# Patient Record
Sex: MALE | Race: WHITE | NOT HISPANIC OR LATINO
[De-identification: names, ages, dates, MRNs, and addresses within clinical notes are randomized per-mention and may not be internally consistent; named-entity substitution may affect disease eponyms.]

---

## 2017-08-18 ENCOUNTER — APPOINTMENT (OUTPATIENT)
Dept: NEUROLOGY | Facility: CLINIC | Age: 47
End: 2017-08-18

## 2019-10-23 ENCOUNTER — APPOINTMENT (OUTPATIENT)
Dept: NEUROLOGY | Facility: CLINIC | Age: 49
End: 2019-10-23

## 2020-12-01 ENCOUNTER — INPATIENT (INPATIENT)
Facility: HOSPITAL | Age: 50
LOS: 3 days | Discharge: HOME | DRG: 871 | End: 2020-12-05
Attending: INTERNAL MEDICINE | Admitting: INTERNAL MEDICINE
Payer: MEDICAID

## 2020-12-01 ENCOUNTER — OUTPATIENT (OUTPATIENT)
Dept: OUTPATIENT SERVICES | Facility: HOSPITAL | Age: 50
LOS: 1 days | End: 2020-12-01
Payer: MEDICAID

## 2020-12-01 VITALS
RESPIRATION RATE: 18 BRPM | WEIGHT: 199.96 LBS | SYSTOLIC BLOOD PRESSURE: 94 MMHG | HEART RATE: 102 BPM | DIASTOLIC BLOOD PRESSURE: 67 MMHG | HEIGHT: 66 IN | TEMPERATURE: 100 F | OXYGEN SATURATION: 97 %

## 2020-12-01 DIAGNOSIS — I21.4 NON-ST ELEVATION (NSTEMI) MYOCARDIAL INFARCTION: ICD-10-CM

## 2020-12-01 DIAGNOSIS — U07.1 COVID-19: ICD-10-CM

## 2020-12-01 DIAGNOSIS — N17.9 ACUTE KIDNEY FAILURE, UNSPECIFIED: ICD-10-CM

## 2020-12-01 DIAGNOSIS — R19.7 DIARRHEA, UNSPECIFIED: ICD-10-CM

## 2020-12-01 DIAGNOSIS — Z29.9 ENCOUNTER FOR PROPHYLACTIC MEASURES, UNSPECIFIED: ICD-10-CM

## 2020-12-01 DIAGNOSIS — R55 SYNCOPE AND COLLAPSE: ICD-10-CM

## 2020-12-01 LAB
24R-OH-CALCIDIOL SERPL-MCNC: 43.6 NG/ML — SIGNIFICANT CHANGE UP (ref 30–80)
A1C WITH ESTIMATED AVERAGE GLUCOSE RESULT: 7 % — HIGH (ref 4–5.6)
ALBUMIN SERPL ELPH-MCNC: 3.4 G/DL — LOW (ref 3.5–5)
ALP SERPL-CCNC: 66 U/L — SIGNIFICANT CHANGE UP (ref 40–120)
ALT FLD-CCNC: 45 U/L DA — SIGNIFICANT CHANGE UP (ref 10–60)
ANION GAP SERPL CALC-SCNC: 10 MMOL/L — SIGNIFICANT CHANGE UP (ref 5–17)
ANION GAP SERPL CALC-SCNC: 12 MMOL/L — SIGNIFICANT CHANGE UP (ref 5–17)
ANION GAP SERPL CALC-SCNC: 9 MMOL/L — SIGNIFICANT CHANGE UP (ref 5–17)
APPEARANCE UR: CLEAR — SIGNIFICANT CHANGE UP
APTT BLD: 32.3 SEC — SIGNIFICANT CHANGE UP (ref 27.5–35.5)
AST SERPL-CCNC: 52 U/L — HIGH (ref 10–40)
BACTERIA # UR AUTO: NEGATIVE /HPF — SIGNIFICANT CHANGE UP
BASOPHILS # BLD AUTO: 0.01 K/UL — SIGNIFICANT CHANGE UP (ref 0–0.2)
BASOPHILS NFR BLD AUTO: 0.2 % — SIGNIFICANT CHANGE UP (ref 0–2)
BILIRUB SERPL-MCNC: 0.9 MG/DL — SIGNIFICANT CHANGE UP (ref 0.2–1.2)
BILIRUB UR-MCNC: NEGATIVE — SIGNIFICANT CHANGE UP
BUN SERPL-MCNC: 31 MG/DL — HIGH (ref 7–18)
BUN SERPL-MCNC: 35 MG/DL — HIGH (ref 7–18)
BUN SERPL-MCNC: 38 MG/DL — HIGH (ref 7–18)
CALCIUM SERPL-MCNC: 7.9 MG/DL — LOW (ref 8.4–10.5)
CALCIUM SERPL-MCNC: 8.2 MG/DL — LOW (ref 8.4–10.5)
CALCIUM SERPL-MCNC: 8.6 MG/DL — SIGNIFICANT CHANGE UP (ref 8.4–10.5)
CHLORIDE SERPL-SCNC: 100 MMOL/L — SIGNIFICANT CHANGE UP (ref 96–108)
CHLORIDE SERPL-SCNC: 95 MMOL/L — LOW (ref 96–108)
CHLORIDE SERPL-SCNC: 98 MMOL/L — SIGNIFICANT CHANGE UP (ref 96–108)
CHOLEST SERPL-MCNC: 93 MG/DL — SIGNIFICANT CHANGE UP
CK MB BLD-MCNC: 0.4 % — SIGNIFICANT CHANGE UP (ref 0–3.5)
CK MB CFR SERPL CALC: 1.8 NG/ML — SIGNIFICANT CHANGE UP (ref 0–3.6)
CK SERPL-CCNC: 503 U/L — HIGH (ref 35–232)
CO2 SERPL-SCNC: 25 MMOL/L — SIGNIFICANT CHANGE UP (ref 22–31)
CO2 SERPL-SCNC: 26 MMOL/L — SIGNIFICANT CHANGE UP (ref 22–31)
CO2 SERPL-SCNC: 27 MMOL/L — SIGNIFICANT CHANGE UP (ref 22–31)
COLOR SPEC: YELLOW — SIGNIFICANT CHANGE UP
CREAT ?TM UR-MCNC: 69 MG/DL — SIGNIFICANT CHANGE UP
CREAT SERPL-MCNC: 2.1 MG/DL — HIGH (ref 0.5–1.3)
CREAT SERPL-MCNC: 2.2 MG/DL — HIGH (ref 0.5–1.3)
CREAT SERPL-MCNC: 2.42 MG/DL — HIGH (ref 0.5–1.3)
D DIMER BLD IA.RAPID-MCNC: 388 NG/ML DDU — HIGH
DIFF PNL FLD: ABNORMAL
EOSINOPHIL # BLD AUTO: 0 K/UL — SIGNIFICANT CHANGE UP (ref 0–0.5)
EOSINOPHIL NFR BLD AUTO: 0 % — SIGNIFICANT CHANGE UP (ref 0–6)
EPI CELLS # UR: ABNORMAL /HPF
ESTIMATED AVERAGE GLUCOSE: 154 MG/DL — HIGH (ref 68–114)
FERRITIN SERPL-MCNC: 761 NG/ML — HIGH (ref 30–400)
FOLATE SERPL-MCNC: >20 NG/ML — SIGNIFICANT CHANGE UP
GLUCOSE SERPL-MCNC: 112 MG/DL — HIGH (ref 70–99)
GLUCOSE SERPL-MCNC: 126 MG/DL — HIGH (ref 70–99)
GLUCOSE SERPL-MCNC: 145 MG/DL — HIGH (ref 70–99)
GLUCOSE UR QL: NEGATIVE — SIGNIFICANT CHANGE UP
HCT VFR BLD CALC: 34.3 % — LOW (ref 39–50)
HCT VFR BLD CALC: 36.9 % — LOW (ref 39–50)
HDLC SERPL-MCNC: 32 MG/DL — LOW
HGB BLD-MCNC: 11.2 G/DL — LOW (ref 13–17)
HGB BLD-MCNC: 12.5 G/DL — LOW (ref 13–17)
IMM GRANULOCYTES NFR BLD AUTO: 0.2 % — SIGNIFICANT CHANGE UP (ref 0–1.5)
INR BLD: 1.23 RATIO — HIGH (ref 0.88–1.16)
IRON SATN MFR SERPL: 10 % — LOW (ref 20–55)
IRON SATN MFR SERPL: 20 UG/DL — LOW (ref 65–170)
KETONES UR-MCNC: NEGATIVE — SIGNIFICANT CHANGE UP
LDH SERPL L TO P-CCNC: 371 U/L — HIGH (ref 120–225)
LEUKOCYTE ESTERASE UR-ACNC: NEGATIVE — SIGNIFICANT CHANGE UP
LIPID PNL WITH DIRECT LDL SERPL: 26 MG/DL — SIGNIFICANT CHANGE UP
LYMPHOCYTES # BLD AUTO: 0.95 K/UL — LOW (ref 1–3.3)
LYMPHOCYTES # BLD AUTO: 20.6 % — SIGNIFICANT CHANGE UP (ref 13–44)
MAGNESIUM SERPL-MCNC: 1.8 MG/DL — SIGNIFICANT CHANGE UP (ref 1.6–2.6)
MAGNESIUM SERPL-MCNC: 1.9 MG/DL — SIGNIFICANT CHANGE UP (ref 1.6–2.6)
MCHC RBC-ENTMCNC: 29.2 PG — SIGNIFICANT CHANGE UP (ref 27–34)
MCHC RBC-ENTMCNC: 29.8 PG — SIGNIFICANT CHANGE UP (ref 27–34)
MCHC RBC-ENTMCNC: 32.7 GM/DL — SIGNIFICANT CHANGE UP (ref 32–36)
MCHC RBC-ENTMCNC: 33.9 GM/DL — SIGNIFICANT CHANGE UP (ref 32–36)
MCV RBC AUTO: 87.9 FL — SIGNIFICANT CHANGE UP (ref 80–100)
MCV RBC AUTO: 89.3 FL — SIGNIFICANT CHANGE UP (ref 80–100)
MONOCYTES # BLD AUTO: 0.32 K/UL — SIGNIFICANT CHANGE UP (ref 0–0.9)
MONOCYTES NFR BLD AUTO: 6.9 % — SIGNIFICANT CHANGE UP (ref 2–14)
NEUTROPHILS # BLD AUTO: 3.32 K/UL — SIGNIFICANT CHANGE UP (ref 1.8–7.4)
NEUTROPHILS NFR BLD AUTO: 72.1 % — SIGNIFICANT CHANGE UP (ref 43–77)
NITRITE UR-MCNC: NEGATIVE — SIGNIFICANT CHANGE UP
NON HDL CHOLESTEROL: 61 MG/DL — SIGNIFICANT CHANGE UP
NRBC # BLD: 0 /100 WBCS — SIGNIFICANT CHANGE UP (ref 0–0)
NRBC # BLD: 0 /100 WBCS — SIGNIFICANT CHANGE UP (ref 0–0)
PH UR: 6 — SIGNIFICANT CHANGE UP (ref 5–8)
PHOSPHATE SERPL-MCNC: 2.2 MG/DL — LOW (ref 2.5–4.5)
PHOSPHATE SERPL-MCNC: 2.9 MG/DL — SIGNIFICANT CHANGE UP (ref 2.5–4.5)
PLATELET # BLD AUTO: 149 K/UL — LOW (ref 150–400)
PLATELET # BLD AUTO: 179 K/UL — SIGNIFICANT CHANGE UP (ref 150–400)
POTASSIUM SERPL-MCNC: 3.3 MMOL/L — LOW (ref 3.5–5.3)
POTASSIUM SERPL-MCNC: 3.4 MMOL/L — LOW (ref 3.5–5.3)
POTASSIUM SERPL-MCNC: 4 MMOL/L — SIGNIFICANT CHANGE UP (ref 3.5–5.3)
POTASSIUM SERPL-SCNC: 3.3 MMOL/L — LOW (ref 3.5–5.3)
POTASSIUM SERPL-SCNC: 3.4 MMOL/L — LOW (ref 3.5–5.3)
POTASSIUM SERPL-SCNC: 4 MMOL/L — SIGNIFICANT CHANGE UP (ref 3.5–5.3)
POTASSIUM UR-SCNC: 21 MMOL/L — SIGNIFICANT CHANGE UP
PROT SERPL-MCNC: 8.2 G/DL — SIGNIFICANT CHANGE UP (ref 6–8.3)
PROT UR-MCNC: NEGATIVE — SIGNIFICANT CHANGE UP
PROTHROM AB SERPL-ACNC: 14.5 SEC — HIGH (ref 10.6–13.6)
RAPID RVP RESULT: DETECTED
RBC # BLD: 3.84 M/UL — LOW (ref 4.2–5.8)
RBC # BLD: 4.2 M/UL — SIGNIFICANT CHANGE UP (ref 4.2–5.8)
RBC # FLD: 13.2 % — SIGNIFICANT CHANGE UP (ref 10.3–14.5)
RBC # FLD: 13.3 % — SIGNIFICANT CHANGE UP (ref 10.3–14.5)
RBC CASTS # UR COMP ASSIST: SIGNIFICANT CHANGE UP /HPF (ref 0–2)
SARS-COV-2 IGG SERPL QL IA: NEGATIVE — SIGNIFICANT CHANGE UP
SARS-COV-2 IGM SERPL IA-ACNC: 0.07 INDEX — SIGNIFICANT CHANGE UP
SARS-COV-2 RNA SPEC QL NAA+PROBE: DETECTED
SARS-COV-2 RNA SPEC QL NAA+PROBE: DETECTED
SODIUM SERPL-SCNC: 132 MMOL/L — LOW (ref 135–145)
SODIUM SERPL-SCNC: 135 MMOL/L — SIGNIFICANT CHANGE UP (ref 135–145)
SODIUM SERPL-SCNC: 135 MMOL/L — SIGNIFICANT CHANGE UP (ref 135–145)
SODIUM UR-SCNC: 94 MMOL/L — SIGNIFICANT CHANGE UP
SP GR SPEC: 1.01 — SIGNIFICANT CHANGE UP (ref 1.01–1.02)
TIBC SERPL-MCNC: 194 UG/DL — LOW (ref 250–450)
TRIGL SERPL-MCNC: 175 MG/DL — HIGH
TROPONIN I SERPL-MCNC: 0.27 NG/ML — HIGH (ref 0–0.04)
TROPONIN I SERPL-MCNC: 0.3 NG/ML — HIGH (ref 0–0.04)
TROPONIN I SERPL-MCNC: 0.9 NG/ML — HIGH (ref 0–0.04)
TSH SERPL-MCNC: 1.02 UU/ML — SIGNIFICANT CHANGE UP (ref 0.34–4.82)
UIBC SERPL-MCNC: 174 UG/DL — SIGNIFICANT CHANGE UP (ref 110–370)
UROBILINOGEN FLD QL: NEGATIVE — SIGNIFICANT CHANGE UP
VIT B12 SERPL-MCNC: 917 PG/ML — SIGNIFICANT CHANGE UP (ref 232–1245)
WBC # BLD: 3.39 K/UL — LOW (ref 3.8–10.5)
WBC # BLD: 4.61 K/UL — SIGNIFICANT CHANGE UP (ref 3.8–10.5)
WBC # FLD AUTO: 3.39 K/UL — LOW (ref 3.8–10.5)
WBC # FLD AUTO: 4.61 K/UL — SIGNIFICANT CHANGE UP (ref 3.8–10.5)
WBC UR QL: SIGNIFICANT CHANGE UP /HPF (ref 0–5)

## 2020-12-01 PROCEDURE — 71045 X-RAY EXAM CHEST 1 VIEW: CPT | Mod: 26

## 2020-12-01 PROCEDURE — 99285 EMERGENCY DEPT VISIT HI MDM: CPT

## 2020-12-01 RX ORDER — DEXTROSE 50 % IN WATER 50 %
15 SYRINGE (ML) INTRAVENOUS ONCE
Refills: 0 | Status: DISCONTINUED | OUTPATIENT
Start: 2020-12-01 | End: 2020-12-02

## 2020-12-01 RX ORDER — INSULIN LISPRO 100/ML
VIAL (ML) SUBCUTANEOUS
Refills: 0 | Status: DISCONTINUED | OUTPATIENT
Start: 2020-12-01 | End: 2020-12-05

## 2020-12-01 RX ORDER — POTASSIUM PHOSPHATE, MONOBASIC POTASSIUM PHOSPHATE, DIBASIC 236; 224 MG/ML; MG/ML
15 INJECTION, SOLUTION INTRAVENOUS ONCE
Refills: 0 | Status: COMPLETED | OUTPATIENT
Start: 2020-12-01 | End: 2020-12-01

## 2020-12-01 RX ORDER — POTASSIUM CHLORIDE 20 MEQ
40 PACKET (EA) ORAL ONCE
Refills: 0 | Status: COMPLETED | OUTPATIENT
Start: 2020-12-01 | End: 2020-12-01

## 2020-12-01 RX ORDER — ASPIRIN/CALCIUM CARB/MAGNESIUM 324 MG
81 TABLET ORAL ONCE
Refills: 0 | Status: COMPLETED | OUTPATIENT
Start: 2020-12-01 | End: 2020-12-01

## 2020-12-01 RX ORDER — ACETAMINOPHEN 500 MG
650 TABLET ORAL EVERY 6 HOURS
Refills: 0 | Status: DISCONTINUED | OUTPATIENT
Start: 2020-12-01 | End: 2020-12-05

## 2020-12-01 RX ORDER — SODIUM CHLORIDE 9 MG/ML
1000 INJECTION, SOLUTION INTRAVENOUS
Refills: 0 | Status: DISCONTINUED | OUTPATIENT
Start: 2020-12-01 | End: 2020-12-02

## 2020-12-01 RX ORDER — DEXTROSE 50 % IN WATER 50 %
25 SYRINGE (ML) INTRAVENOUS ONCE
Refills: 0 | Status: DISCONTINUED | OUTPATIENT
Start: 2020-12-01 | End: 2020-12-02

## 2020-12-01 RX ORDER — ACETAMINOPHEN 500 MG
650 TABLET ORAL ONCE
Refills: 0 | Status: COMPLETED | OUTPATIENT
Start: 2020-12-01 | End: 2020-12-01

## 2020-12-01 RX ORDER — HEPARIN SODIUM 5000 [USP'U]/ML
5000 INJECTION INTRAVENOUS; SUBCUTANEOUS EVERY 8 HOURS
Refills: 0 | Status: DISCONTINUED | OUTPATIENT
Start: 2020-12-01 | End: 2020-12-05

## 2020-12-01 RX ORDER — PANTOPRAZOLE SODIUM 20 MG/1
40 TABLET, DELAYED RELEASE ORAL
Refills: 0 | Status: DISCONTINUED | OUTPATIENT
Start: 2020-12-01 | End: 2020-12-02

## 2020-12-01 RX ORDER — ATORVASTATIN CALCIUM 80 MG/1
80 TABLET, FILM COATED ORAL AT BEDTIME
Refills: 0 | Status: DISCONTINUED | OUTPATIENT
Start: 2020-12-01 | End: 2020-12-05

## 2020-12-01 RX ORDER — HEPARIN SODIUM 5000 [USP'U]/ML
5000 INJECTION INTRAVENOUS; SUBCUTANEOUS EVERY 8 HOURS
Refills: 0 | Status: DISCONTINUED | OUTPATIENT
Start: 2020-12-01 | End: 2020-12-01

## 2020-12-01 RX ORDER — SODIUM CHLORIDE 9 MG/ML
2000 INJECTION INTRAMUSCULAR; INTRAVENOUS; SUBCUTANEOUS ONCE
Refills: 0 | Status: COMPLETED | OUTPATIENT
Start: 2020-12-01 | End: 2020-12-01

## 2020-12-01 RX ORDER — GLUCAGON INJECTION, SOLUTION 0.5 MG/.1ML
1 INJECTION, SOLUTION SUBCUTANEOUS ONCE
Refills: 0 | Status: DISCONTINUED | OUTPATIENT
Start: 2020-12-01 | End: 2020-12-05

## 2020-12-01 RX ORDER — LOPERAMIDE HCL 2 MG
4 TABLET ORAL ONCE
Refills: 0 | Status: COMPLETED | OUTPATIENT
Start: 2020-12-01 | End: 2020-12-01

## 2020-12-01 RX ORDER — DEXTROSE 50 % IN WATER 50 %
12.5 SYRINGE (ML) INTRAVENOUS ONCE
Refills: 0 | Status: DISCONTINUED | OUTPATIENT
Start: 2020-12-01 | End: 2020-12-02

## 2020-12-01 RX ORDER — ASPIRIN/CALCIUM CARB/MAGNESIUM 324 MG
81 TABLET ORAL DAILY
Refills: 0 | Status: DISCONTINUED | OUTPATIENT
Start: 2020-12-01 | End: 2020-12-05

## 2020-12-01 RX ADMIN — SODIUM CHLORIDE 4000 MILLILITER(S): 9 INJECTION INTRAMUSCULAR; INTRAVENOUS; SUBCUTANEOUS at 09:26

## 2020-12-01 RX ADMIN — Medication 40 MILLIEQUIVALENT(S): at 14:05

## 2020-12-01 RX ADMIN — Medication 4 MILLIGRAM(S): at 09:26

## 2020-12-01 RX ADMIN — Medication 40 MILLIEQUIVALENT(S): at 11:09

## 2020-12-01 RX ADMIN — ATORVASTATIN CALCIUM 80 MILLIGRAM(S): 80 TABLET, FILM COATED ORAL at 21:38

## 2020-12-01 RX ADMIN — Medication 650 MILLIGRAM(S): at 22:07

## 2020-12-01 RX ADMIN — Medication 81 MILLIGRAM(S): at 11:08

## 2020-12-01 RX ADMIN — POTASSIUM PHOSPHATE, MONOBASIC POTASSIUM PHOSPHATE, DIBASIC 62.5 MILLIMOLE(S): 236; 224 INJECTION, SOLUTION INTRAVENOUS at 21:39

## 2020-12-01 RX ADMIN — HEPARIN SODIUM 5000 UNIT(S): 5000 INJECTION INTRAVENOUS; SUBCUTANEOUS at 21:37

## 2020-12-01 RX ADMIN — HEPARIN SODIUM 5000 UNIT(S): 5000 INJECTION INTRAVENOUS; SUBCUTANEOUS at 14:04

## 2020-12-01 RX ADMIN — Medication 81 MILLIGRAM(S): at 21:37

## 2020-12-01 RX ADMIN — Medication 650 MILLIGRAM(S): at 20:02

## 2020-12-01 NOTE — CONSULT NOTE ADULT - NEGATIVE ENMT SYMPTOMS
no tinnitus/no hearing difficulty/no vertigo/no nasal congestion/no nasal discharge/no nasal obstruction/no post-nasal discharge/no throat pain/no dysphagia/no nose bleeds/no ear pain/no sinus symptoms/no gum bleeding/no dry mouth

## 2020-12-01 NOTE — CONSULT NOTE ADULT - RS GEN PE MLT RESP DETAILS PC
no wheezes/no rhonchi/no rales/respirations non-labored/breath sounds equal/good air movement/airway patent

## 2020-12-01 NOTE — ED ADULT NURSE NOTE - CHIEF COMPLAINT QUOTE
biba s/p syncopal episode at home c/o feeling weak , diarrhea x 3 days after eating at [employer] . no abd pain no n/v

## 2020-12-01 NOTE — CONSULT NOTE ADULT - NEGATIVE GASTROINTESTINAL SYMPTOMS
no melena/no constipation/no steatorrhea/no nausea/no jaundice/no vomiting/no change in bowel habits/no flatulence/no abdominal pain/no hematochezia/no hiccoughs

## 2020-12-01 NOTE — H&P ADULT - ASSESSMENT
49yo M from home lives with family walks independently with PMH pre-DM , HLD , HTN, cardiac attack and stroke (residual left hemianopsia), OA of b/l Knees presented with and episode of witnessed syncope in the morning. admitted for syncope workup likely in setting of Covid infection.

## 2020-12-01 NOTE — H&P ADULT - PROBLEM SELECTOR PLAN 3
history of heart attach and stroke x4 years ago with residual left parietal hemianopsia   elevated troponin, no ECG changes, likely demand ischemia in setting of COVID  Troponin : 0.2>0.3   follow up troponin   Serial ECG   Echo  Cardiology Dr Miguel

## 2020-12-01 NOTE — H&P ADULT - PROBLEM SELECTOR PLAN 1
s/p few days of FTT and diarrhea , likely vasovagal due to dehydration  orthostatic sp s/p few days of FTT and diarrhea , likely vasovagal due to dehydration  orthostatic sp IVF in ED negative   Tele    echo   Troponin : 0.2>0.3   follow up troponin   carotid doppler   Cardio Dr Miguel consulted

## 2020-12-01 NOTE — H&P ADULT - PROBLEM SELECTOR PLAN 5
LUCI  gina in setting of COVID versus pre renal azotemia   - Check urinalysis, urine lytes (uosm, urine sodium, urine creatinine)   - Nephro Dr Rubio consulted

## 2020-12-01 NOTE — ED ADULT NURSE NOTE - NSIMPLEMENTINTERV_GEN_ALL_ED
Implemented All Fall with Harm Risk Interventions:  [place] to call system. Call bell, personal items and telephone within reach. Instruct patient to call for assistance. Room bathroom lighting operational. Non-slip footwear when patient is off stretcher. Physically safe environment: no spills, clutter or unnecessary equipment. Stretcher in lowest position, wheels locked, appropriate side rails in place. Provide visual cue, wrist band, yellow gown, etc. Monitor gait and stability. Monitor for mental status changes and reorient to person, place, and time. Review medications for side effects contributing to fall risk. Reinforce activity limits and safety measures with patient and family. Provide visual clues: red socks.

## 2020-12-01 NOTE — H&P ADULT - HISTORY OF PRESENT ILLNESS
51yo M from home lives with family walks independently with PMH pre-DM , HLD , HTN, cardiac attack and stroke (residual left hemianopsia), OA of b/l Knees presented with and episode of witnessed syncope in the morning . pt reports food poisoning associated with multiple watery non bloody diarrhea , poor oral intake for 3 days. pt had his Bp medication with small amount of water during past 3 days. pt  went to the bathroom this AM and had diarrhea, when he stood up he felt dizzy and passed out. LOC for seconds , denies chest pain , palpitation , sob , cold sweats , chills, Nausea , vomiting any focal weakness or numbness. Pt had a history of cardiac attack and stroke ( with residual left peripheral vision loss)  4 years ago, had been on blood thinners for 3 months after cardiac attack ,  follows up with cardiologist regularly , last echo in June 2020 showed cardiac function within normal limits. patient non smoker, denies pedal edema , ARAGON , orthopnea , PND, can walk >1-0 blocks and goes more than 4 flights of stairs. never had stress test in the past       Ed course : T1 mildly elevated : 0.274, ECG :NSR , SCr : 2.42 , received 2000cc NS   GOC: FULL CODE 51yo M from home lives with family walks independently with PMH pre-DM , HLD , HTN, cardiac attack and stroke (residual left hemianopsia), OA of b/l Knees presented with and episode of witnessed syncope in the morning . pt reports food poisoning associated with multiple watery non bloody diarrhea , poor oral intake for 3 days. pt had his Bp medication with small amount of water during past 3 days. pt  went to the bathroom this AM and had diarrhea, when he stood up he felt dizzy and passed out. LOC for seconds , denies chest pain , palpitation , sob , cold sweats , chills, Nausea , vomiting any focal weakness or numbness. Pt had a history of cardiac attack and stroke ( with residual left peripheral vision loss)  4 years ago, had been on blood thinners for 3 months after cardiac attack ,  follows up with cardiologist regularly , last echo in June 2020 showed cardiac function within normal limits. patient non smoker, denies pedal edema , ARAGON , orthopnea , PND, can walk >1-0 blocks and goes more than 4 flights of stairs. never had stress test in the past       Ed course : T1 mildly elevated : 0.274, ECG :NSR , SCr : 2.42 , received 2000cc NS    ECG : NSR   GOC: FULL CODE 49yo M from home lives with family walks independently with PMH pre-DM , HLD , HTN, cardiac attack and stroke (residual left hemianopsia), OA of b/l Knees presented with and episode of witnessed syncope in the morning . pt reports food poisoning associated with multiple watery non bloody diarrhea , poor oral intake for 3 days. pt had his Bp medication with small amount of water during past 3 days. pt  went to the bathroom this AM and had diarrhea, when he stood up he felt dizzy and passed out. LOC for seconds , denies chest pain , palpitation , sob , cold sweats , chills, Nausea , vomiting any focal weakness or numbness. Pt had a history of cardiac attack and stroke ( with residual left peripheral vision loss)  4 years ago, had been on blood thinners for 3 months after cardiac attack ,  follows up with cardiologist regularly , last echo in June 2020 showed cardiac function within normal limits. patient non smoker, denies pedal edema , ARAGON , orthopnea , PND, can walk >10 blocks and goes more than 4 flights of stairs. never had stress test in the past       Ed course : T1 mildly elevated : 0.274, ECG :NSR , SCr : 2.42 , received 2000cc NS    ECG : NSR   GOC: FULL CODE

## 2020-12-01 NOTE — ED PROVIDER NOTE - CLINICAL SUMMARY MEDICAL DECISION MAKING FREE TEXT BOX
50 year old male with vasovagal syncope after diarrhea for days, getting off toilet today when he passed out. No complaints now, well appearing, but BP is low, will hydrate, check labs and reassess.

## 2020-12-01 NOTE — H&P ADULT - PROBLEM SELECTOR PLAN 6
c/w heparin 5000 TID for now , change to Lovenox onc kidney functio improved since Lovenox is Ac of choice in COVID

## 2020-12-01 NOTE — H&P ADULT - PROBLEM SELECTOR PLAN 4
patient with positive covid, spiked fever during admission   CXR   no acute infiltrates    saturating >96% on RA    s/p 2000cc Ns in ED   -Will send ESR, CRP, Procalcitonin and other COVID markers   -Tylenol PRN for fever  -Will start patient on prophylactic Heparin   -continue to trend D-dimer, CRP, LDH, Troponin, Ferritin, CPK. patient with positive covid, spiked fever during admission   CXR   no acute infiltrates    saturating >96% on RA    s/p 2000cc Ns in ED   -Will send ESR, CRP, Procalcitonin and other COVID markers   -Tylenol PRN for fever  -Will start patient on prophylactic Heparin   -continue to trend D-dimer, CRP, LDH, Troponin, Ferritin, CPK.  - will not consider Decadron or Remdesivir since patient is not requiring oxygen supplementation

## 2020-12-01 NOTE — CONSULT NOTE ADULT - SUBJECTIVE AND OBJECTIVE BOX
[  ] STAT REQUEST              [ X ] ROUTINE REQUEST    Patient is a 50 year old with diarrhea. GI consulted to evaluate.        HPI:  49yo M from home lives with family walks independently with PMH pre-DM , HLD , HTN, cardiac attack and stroke (residual left hemianopsia), OA of b/l Knees presented with and episode of witnessed syncope in the morning . Patient reports 3 days history of watery non bloody diarrhea and poor oral intake. for 3 days. pt had his Bp medication with small amount of water during past 3 days. pt  went to the bathroom this AM and had diarrhea, when he stood up he felt dizzy and passed out. Patient denies hematemesis, hematochezia, melena, fever, chills, chest pain , palpitation , SOB, palpitation, cough, hematuria, dysuria, recent traveling or antibiotic use.       PAIN MANAGEMENT:  Pain Scale:                 0/10  Pain Location:      Prior Colonoscopy:  No prior colonoscopy    PAST MEDICAL HISTORY  HTN  DM  HLD  CAD  OA, MI        PAST SURGICAL HISTORY  No significant surgical history reported      Allergies    No Known Allergies    Intolerances  None       MEDICATIONS  (STANDING):  aspirin enteric coated 81 milliGRAM(s) Oral daily  atorvastatin 80 milliGRAM(s) Oral at bedtime  dextrose 40% Gel 15 Gram(s) Oral once  dextrose 5%. 1000 milliLiter(s) (50 mL/Hr) IV Continuous <Continuous>  dextrose 5%. 1000 milliLiter(s) (100 mL/Hr) IV Continuous <Continuous>  dextrose 50% Injectable 25 Gram(s) IV Push once  dextrose 50% Injectable 12.5 Gram(s) IV Push once  dextrose 50% Injectable 25 Gram(s) IV Push once  glucagon  Injectable 1 milliGRAM(s) IntraMuscular once  heparin   Injectable 5000 Unit(s) SubCutaneous every 8 hours  insulin lispro (ADMELOG) corrective regimen sliding scale   SubCutaneous Before meals and at bedtime  pantoprazole    Tablet 40 milliGRAM(s) Oral before breakfast  potassium phosphate IVPB 15 milliMole(s) IV Intermittent once    MEDICATIONS  (PRN):  acetaminophen   Tablet .. 650 milliGRAM(s) Oral every 6 hours PRN Temp greater or equal to 38C (100.4F), Mild Pain (1 - 3)      SOCIAL HISTORY  Advanced Directives:       [ X ] Full Code       [  ] DNR  Marital Status:         [  ] M      [  X] S      [  ] D       [  ] W  Children:       [ X ] Yes      [  ] No  Occupation:        [  ] Employed       [ X ] Unemployed       [  ] Retired  Diet:       [ X ] Regular       [  ] PEG feeding          [  ] NG tube feeding  Drug Use:           [ X ] Patient denied          [  ] Yes  Alcohol:           [ X ] No             [  ] Yes (socially)         [  ] Yes (chronic)  Tobacco:           [  ] Yes           [ X ] No    FAMILY HISTORY  [ X ] Heart Disease            [ X ] Diabetes             [ X ] HTN             [  ] Colon Cancer             [  ] Stomach Cancer              [  ] Pancreatic Cancer      VITAL SIGNS   Vital Signs Last 24 Hrs  T(C): 38.3 (01 Dec 2020 18:14), Max: 38.3 (01 Dec 2020 18:14)  T(F): 101 (01 Dec 2020 18:14), Max: 101 (01 Dec 2020 18:14)  HR: 95 (01 Dec 2020 18:14) (95 - 102)  BP: 120/83 (01 Dec 2020 18:14) (94/67 - 136/83)   RR: 18 (01 Dec 2020 18:14) (18 - 18)  SpO2: 96% (01 Dec 2020 18:14) (96% - 98%)  Daily Height in cm: 167.64 (01 Dec 2020 07:31)    Daily Weight in k.1 (01 Dec 2020 18:14)         CBC Full  -  ( 01 Dec 2020 12:16 )  WBC Count : 3.39 K/uL  RBC Count : 3.84 M/uL  Hemoglobin : 11.2 g/dL  Hematocrit : 34.3 %  Platelet Count - Automated : 149 K/uL  Mean Cell Volume : 89.3 fl  Mean Cell Hemoglobin : 29.2 pg  Mean Cell Hemoglobin Concentration : 32.7 gm/dL  Auto Neutrophil # : x  Auto Lymphocyte # : x  Auto Monocyte # : x  Auto Eosinophil # : x  Auto Basophil # : x  Auto Neutrophil % : x  Auto Lymphocyte % : x  Auto Monocyte % : x  Auto Eosinophil % : x  Auto Basophil % : x      12    135  |  100  |  31<H>  ----------------------------<  126<H>  4.0   |  26  |  2.10<H>    Ca    8.2<L>      01 Dec 2020 17:27  Phos  2.2     12-  Mg     1.9     12    TPro  8.2  /  Alb  3.4<L>  /  TBili  0.9  /  DBili  x   /  AST  52<H>  /  ALT  45  /  AlkPhos  66  12-     PT/INR - ( 01 Dec 2020 09:44 )   PT: 14.5 sec;   INR: 1.23 ratio       PTT - ( 01 Dec 2020 09:44 )  PTT:32.3 sec      Iron with Total Binding Capacity in AM (12..20 @ 12:16)   Iron - Total Binding Capacity.: 194 ug/dL   % Saturation, Iron: 10 %   Iron Total, Serum: 20 ug/dL   Unsaturated Iron Binding Capacity: 174 ug/dL     Urinalysis + Microscopic Examination    Ketone - Urine: Negative   Bilirubin: Negative   pH Urine: 6.0   Leukocyte Esterase Concentration: Negative   Nitrite: Negative   Urine Appearance: Clear   Urobilinogen: Negative   Specific Gravity: 1.010   Protein, Urine: Negative   Glucose Qualitative, Urine: Negative   Blood, Urine: Trace   Color: Yellow   Red Blood Cell - Urine: 0-2 /HPF   White Blood Cell - Urine: 0-2 /HPF   Epithelial Cells: Occasional /HPF   Bacteria: Negative        EXAM:  XR CHEST PORTABLE URGENT 1V                            PROCEDURE DATE:  2020          INTERPRETATION:  CLINICAL INDICATION: 50 years  Male with syncope.    COMPARISON: None    AP view of the chest demonstrates the lungs to be clear. There is no pleural effusion. There is no pneumothorax.    The heart is normal in size. There is no mediastinal or hilar mass.    The pulmonary vasculature is normal.    Mild thoracic degenerative changes are present.    IMPRESSION:    No acute infiltrate.

## 2020-12-01 NOTE — ED ADULT NURSE NOTE - CHPI ED NUR SYMPTOMS NEG
no shortness of breath/no chest pain/no chills/no vomiting/no dizziness/no back pain/no fever/no congestion/no nausea/no diaphoresis

## 2020-12-01 NOTE — H&P ADULT - NSHPPHYSICALEXAM_GEN_ALL_CORE
CONSTITUTIONAL: Well appearing, well nourished, awake, alert and in no apparent distress  ENMT: dry mucus   EYES: Clear bilaterally, pupils equal, round and reactive to light. EOMI.  CARDIAC: Normal rate, regular rhythm.  Heart sounds S1, S2.  No murmurs, rubs or gallops   RESPIRATORY: Breath sounds clear and equal bilaterally. No wheezes, rhales or rhonchi  MUSCULOSKELETAL: Spine appears normal, range of motion is not limited, no muscle or joint tenderness  EXTREMITIES: No edema, cyanosis or deformity   NEUROLOGICAL: left temporal  hemianopsia   SKIN: No rash, skin turgor  ABD: NT , ND , soft

## 2020-12-01 NOTE — ED PROVIDER NOTE - OBJECTIVE STATEMENT
50 year old male came to the ED after he passed out after going to the bathroom. The pt has had watery non bloody diarrhea for the last 2-3 days after eating [employer] and he has not been eating or drinking much and taking his BP meds. This AM he went to the bathroom and had diarrhea, when he stood up he felt dizzy and passed out. No chest pain, no headache, no neck pain, no sob, no headache, no neck pain, no fever, no chills.

## 2020-12-01 NOTE — H&P ADULT - PROBLEM SELECTOR PROBLEM 5
PLAN:  1. Flu shot today  2. Reviewed echocardiogram, 6 min walk, and labs with patient in the clinic.  3. He does appear intravascular volume high. Recommend increasing torsemide to 20mg daily.   4. BMP, NT-pro BNP next week at nearest [facility] lab.   5. Continue to increase IV Remodulin every 3-10 days as tolerated according to dosing sheet to a new goal dose of 60 ng/kg/min.   6. Continue tadalafil 20mg daily. Will consider augmenting at next clinic visit.   7. Weights daily. To call with 3 lb weight gain overnight or 5 lb weight gain in one week.   8. To limit sodium to 2000 mg daily and fluids to 64 ounces (2000 mL) daily.   9. To avoid the use of NSAIDs, including but not limited to Ibuprofen, Advil and Aleve. Encouraged to check with community pharmacist with all over-the-counter medications to ensure product does not contain NSAIDs.    10. Complete CXR PA and lateral due to cough and dullness in bases on lung exam.   11. Respiratory culture with smear of sputum.   12. Follow-up 3 months in clinic with limited echocardiogram, 6 min walk, labs (CBC, CMP, NT-pro BNP), and right heart catheterization to reassess hemodynamics (will be 1 year since his last one when we see him next).      Call with questions  Delphine FENG  [phone]       
LUCI (acute kidney injury)

## 2020-12-01 NOTE — ED ADULT NURSE NOTE - ED STAT RN HANDOFF DETAILS
Patient admitted to telemetry in no acute distress covid positive assigned to room 507 report given to CHRISTY Osullivan. Patient to be transported via stretcher with cardiac monitor by RN and transporter stable in no acute distress.

## 2020-12-01 NOTE — CONSULT NOTE ADULT - NEGATIVE OPHTHALMOLOGIC SYMPTOMS
no irritation R/no diplopia/no photophobia/no discharge L/no lacrimation L/no irritation L/no scleral injection L/no scleral injection R/no pain R/no blurred vision R/no discharge R/no pain L/no lacrimation R/no blurred vision L

## 2020-12-01 NOTE — PATIENT PROFILE ADULT - SAFE PLACE TO LIVE
Patient states he has MS and sometimes has to take IV treatments. When he comes off of them he tends to get sinus infections and Dr. Sanderson will usually send in a z pack and promethazine cough syrup. Would like this sent to [facility] in [place]. He can be reached at [phone] today if needed.   
Sent both in.  I printed the promethazine with codeine by accident but I also sent it in.  
no

## 2020-12-01 NOTE — ED ADULT NURSE NOTE - OBJECTIVE STATEMENT
Patient present to ED after passing out . As per patient he ate [employer] 3 days ago and became sick N/V/D one episode today prior to arrivsl. Patient denies any dizziness prior to passing out

## 2020-12-01 NOTE — ED ADULT TRIAGE NOTE - CHIEF COMPLAINT QUOTE
biba s/p syncopal episode at home c/o feeling weak , diarrhea x 3 days after eating at [employer] . no abd pain no n/v biba s/p syncopal episode at home c/o feeling weak , diarrhea x 3 days after eating  [employer] . no abd pain no n/v

## 2020-12-01 NOTE — H&P ADULT - ATTENDING COMMENTS
49yo M from home lives with family walks independently with PMH pre-DM , HLD , HTN, cardiac attack and stroke (residual left hemianopsia), OA of b/l Knees presented with and episode of witnessed syncope in the morning . pt reports food poisoning associated with multiple watery non bloody diarrhea , poor oral intake for 3 days. pt had his Bp medication with small amount of water during past 3 days. pt  went to the bathroom this AM and had diarrhea, when he stood up he felt dizzy and passed out. LOC for seconds , denies chest pain , palpitation , sob , cold sweats , chills, Nausea , vomiting any focal weakness or numbness. Pt had a history of cardiac attack and stroke ( with residual left peripheral vision loss)  4 years ago, had been on blood thinners for 3 months after cardiac attack ,  follows up with cardiologist regularly , last echo in June 2020 showed cardiac function within normal limits. patient non smoker, denies pedal edema , ARAGON , orthopnea , PND, can walk >1-0 blocks and goes more than 4 flights of stairs. never had stress test in the past       Ed course : T1 mildly elevated : 0.274, ECG :NSR , SCr : 2.42 , received 2000cc NS    ECG : NSR   GOC: FULL CODE       assessment   --- syncope likely vasovagal, r/o acs, r/o arythmia,  gastroenteritis possibly 2nd to food poisoning, dehydration, h/o  pre-DM , HLD , HTN, cardiac attack and stroke (residual left hemianopsia), OA of b/l Knees    plan  --  adm to tele, aspirin, statin, cipro, flagyl, cont preadmit home meds, gi and dvt profilaxis, ivf   cbc, bmp, mg, phos, lipid, tsh, ce q8 x3    echo      cardio cons  gi cons

## 2020-12-01 NOTE — CONSULT NOTE ADULT - GASTROINTESTINAL DETAILS
no masses palpable/bowel sounds normal/no rigidity/no organomegaly/no bruit/soft/no rebound tenderness/no distention/no guarding/nontender

## 2020-12-01 NOTE — CONSULT NOTE ADULT - NEGATIVE MUSCULOSKELETAL SYMPTOMS
no muscle cramps/no muscle weakness/no arm pain L/no back pain/no neck pain/no arm pain R/no leg pain R/no stiffness/no leg pain L

## 2020-12-01 NOTE — H&P ADULT - PROBLEM SELECTOR PLAN 2
likely in the setting of food poisoning versus COVID   last BM this morning  f/u stool study  started on   GI Dr De La Cruz

## 2020-12-02 LAB
4/8 RATIO: 2.79 RATIO — SIGNIFICANT CHANGE UP (ref 0.9–3.6)
ABS CD8: 182 /UL — SIGNIFICANT CHANGE UP (ref 142–740)
ALBUMIN SERPL ELPH-MCNC: 3.2 G/DL — LOW (ref 3.5–5)
ALP SERPL-CCNC: 61 U/L — SIGNIFICANT CHANGE UP (ref 40–120)
ALT FLD-CCNC: 47 U/L DA — SIGNIFICANT CHANGE UP (ref 10–60)
ANION GAP SERPL CALC-SCNC: 13 MMOL/L — SIGNIFICANT CHANGE UP (ref 5–17)
AST SERPL-CCNC: 64 U/L — HIGH (ref 10–40)
BILIRUB SERPL-MCNC: 0.7 MG/DL — SIGNIFICANT CHANGE UP (ref 0.2–1.2)
BUN SERPL-MCNC: 25 MG/DL — HIGH (ref 7–18)
CALCIUM SERPL-MCNC: 8.3 MG/DL — LOW (ref 8.4–10.5)
CD3 BLASTS SPEC-ACNC: 63 % — SIGNIFICANT CHANGE UP (ref 59–83)
CD3 BLASTS SPEC-ACNC: 697 /UL — SIGNIFICANT CHANGE UP (ref 672–1870)
CD4 %: 46 % — SIGNIFICANT CHANGE UP (ref 30–62)
CD8 %: 17 % — SIGNIFICANT CHANGE UP (ref 12–36)
CHLORIDE SERPL-SCNC: 98 MMOL/L — SIGNIFICANT CHANGE UP (ref 96–108)
CK MB BLD-MCNC: 0.3 % — SIGNIFICANT CHANGE UP (ref 0–3.5)
CK MB CFR SERPL CALC: 1.7 NG/ML — SIGNIFICANT CHANGE UP (ref 0–3.6)
CK SERPL-CCNC: 528 U/L — HIGH (ref 35–232)
CK SERPL-CCNC: 528 U/L — HIGH (ref 35–232)
CO2 SERPL-SCNC: 24 MMOL/L — SIGNIFICANT CHANGE UP (ref 22–31)
CREAT SERPL-MCNC: 1.82 MG/DL — HIGH (ref 0.5–1.3)
FERRITIN SERPL-MCNC: 936 NG/ML — HIGH (ref 30–400)
GLUCOSE BLDC GLUCOMTR-MCNC: 115 MG/DL — HIGH (ref 70–99)
GLUCOSE BLDC GLUCOMTR-MCNC: 118 MG/DL — HIGH (ref 70–99)
GLUCOSE BLDC GLUCOMTR-MCNC: 148 MG/DL — HIGH (ref 70–99)
GLUCOSE BLDC GLUCOMTR-MCNC: 163 MG/DL — HIGH (ref 70–99)
GLUCOSE SERPL-MCNC: 141 MG/DL — HIGH (ref 70–99)
HCT VFR BLD CALC: 35.6 % — LOW (ref 39–50)
HGB BLD-MCNC: 11.7 G/DL — LOW (ref 13–17)
MAGNESIUM SERPL-MCNC: 2 MG/DL — SIGNIFICANT CHANGE UP (ref 1.6–2.6)
MCHC RBC-ENTMCNC: 29.3 PG — SIGNIFICANT CHANGE UP (ref 27–34)
MCHC RBC-ENTMCNC: 32.9 GM/DL — SIGNIFICANT CHANGE UP (ref 32–36)
MCV RBC AUTO: 89 FL — SIGNIFICANT CHANGE UP (ref 80–100)
NRBC # BLD: 0 /100 WBCS — SIGNIFICANT CHANGE UP (ref 0–0)
PHOSPHATE SERPL-MCNC: 2.2 MG/DL — LOW (ref 2.5–4.5)
PLATELET # BLD AUTO: 180 K/UL — SIGNIFICANT CHANGE UP (ref 150–400)
POTASSIUM SERPL-MCNC: 3.6 MMOL/L — SIGNIFICANT CHANGE UP (ref 3.5–5.3)
POTASSIUM SERPL-SCNC: 3.6 MMOL/L — SIGNIFICANT CHANGE UP (ref 3.5–5.3)
PROT SERPL-MCNC: 7.8 G/DL — SIGNIFICANT CHANGE UP (ref 6–8.3)
RBC # BLD: 4 M/UL — LOW (ref 4.2–5.8)
RBC # FLD: 13.3 % — SIGNIFICANT CHANGE UP (ref 10.3–14.5)
SODIUM SERPL-SCNC: 135 MMOL/L — SIGNIFICANT CHANGE UP (ref 135–145)
T-CELL CD4 SUBSET PNL BLD: 510 /UL — SIGNIFICANT CHANGE UP (ref 489–1457)
TROPONIN I SERPL-MCNC: 0.82 NG/ML — HIGH (ref 0–0.04)
WBC # BLD: 4.36 K/UL — SIGNIFICANT CHANGE UP (ref 3.8–10.5)
WBC # FLD AUTO: 4.36 K/UL — SIGNIFICANT CHANGE UP (ref 3.8–10.5)

## 2020-12-02 RX ORDER — ASCORBIC ACID 60 MG
1000 TABLET,CHEWABLE ORAL DAILY
Refills: 0 | Status: DISCONTINUED | OUTPATIENT
Start: 2020-12-02 | End: 2020-12-05

## 2020-12-02 RX ORDER — FAMOTIDINE 10 MG/ML
40 INJECTION INTRAVENOUS
Refills: 0 | Status: DISCONTINUED | OUTPATIENT
Start: 2020-12-02 | End: 2020-12-05

## 2020-12-02 RX ORDER — CIPROFLOXACIN LACTATE 400MG/40ML
VIAL (ML) INTRAVENOUS
Refills: 0 | Status: DISCONTINUED | OUTPATIENT
Start: 2020-12-02 | End: 2020-12-03

## 2020-12-02 RX ORDER — MONTELUKAST 4 MG/1
10 TABLET, CHEWABLE ORAL AT BEDTIME
Refills: 0 | Status: DISCONTINUED | OUTPATIENT
Start: 2020-12-02 | End: 2020-12-05

## 2020-12-02 RX ORDER — INSULIN LISPRO 100/ML
VIAL (ML) SUBCUTANEOUS
Refills: 0 | Status: DISCONTINUED | OUTPATIENT
Start: 2020-12-02 | End: 2020-12-02

## 2020-12-02 RX ORDER — METRONIDAZOLE 500 MG
TABLET ORAL
Refills: 0 | Status: DISCONTINUED | OUTPATIENT
Start: 2020-12-02 | End: 2020-12-03

## 2020-12-02 RX ORDER — LISINOPRIL 2.5 MG/1
1 TABLET ORAL
Qty: 0 | Refills: 0 | DISCHARGE

## 2020-12-02 RX ORDER — CHOLECALCIFEROL (VITAMIN D3) 125 MCG
2000 CAPSULE ORAL DAILY
Refills: 0 | Status: DISCONTINUED | OUTPATIENT
Start: 2020-12-02 | End: 2020-12-04

## 2020-12-02 RX ORDER — CIPROFLOXACIN LACTATE 400MG/40ML
400 VIAL (ML) INTRAVENOUS EVERY 12 HOURS
Refills: 0 | Status: DISCONTINUED | OUTPATIENT
Start: 2020-12-02 | End: 2020-12-03

## 2020-12-02 RX ORDER — METRONIDAZOLE 500 MG
500 TABLET ORAL EVERY 8 HOURS
Refills: 0 | Status: DISCONTINUED | OUTPATIENT
Start: 2020-12-02 | End: 2020-12-03

## 2020-12-02 RX ORDER — ATORVASTATIN CALCIUM 80 MG/1
1 TABLET, FILM COATED ORAL
Qty: 0 | Refills: 0 | DISCHARGE

## 2020-12-02 RX ORDER — METRONIDAZOLE 500 MG
500 TABLET ORAL ONCE
Refills: 0 | Status: COMPLETED | OUTPATIENT
Start: 2020-12-02 | End: 2020-12-02

## 2020-12-02 RX ORDER — METOPROLOL TARTRATE 50 MG
12.5 TABLET ORAL
Refills: 0 | Status: DISCONTINUED | OUTPATIENT
Start: 2020-12-02 | End: 2020-12-03

## 2020-12-02 RX ORDER — ZINC SULFATE TAB 220 MG (50 MG ZINC EQUIVALENT) 220 (50 ZN) MG
220 TAB ORAL DAILY
Refills: 0 | Status: DISCONTINUED | OUTPATIENT
Start: 2020-12-02 | End: 2020-12-05

## 2020-12-02 RX ORDER — CIPROFLOXACIN LACTATE 400MG/40ML
400 VIAL (ML) INTRAVENOUS ONCE
Refills: 0 | Status: COMPLETED | OUTPATIENT
Start: 2020-12-02 | End: 2020-12-02

## 2020-12-02 RX ORDER — ALPRAZOLAM 0.25 MG
0.25 TABLET ORAL ONCE
Refills: 0 | Status: DISCONTINUED | OUTPATIENT
Start: 2020-12-02 | End: 2020-12-02

## 2020-12-02 RX ADMIN — Medication 100 MILLIGRAM(S): at 15:02

## 2020-12-02 RX ADMIN — Medication 650 MILLIGRAM(S): at 17:37

## 2020-12-02 RX ADMIN — Medication 100 MILLIGRAM(S): at 21:09

## 2020-12-02 RX ADMIN — FAMOTIDINE 40 MILLIGRAM(S): 10 INJECTION INTRAVENOUS at 21:09

## 2020-12-02 RX ADMIN — ATORVASTATIN CALCIUM 80 MILLIGRAM(S): 80 TABLET, FILM COATED ORAL at 21:07

## 2020-12-02 RX ADMIN — HEPARIN SODIUM 5000 UNIT(S): 5000 INJECTION INTRAVENOUS; SUBCUTANEOUS at 15:02

## 2020-12-02 RX ADMIN — HEPARIN SODIUM 5000 UNIT(S): 5000 INJECTION INTRAVENOUS; SUBCUTANEOUS at 21:08

## 2020-12-02 RX ADMIN — PANTOPRAZOLE SODIUM 40 MILLIGRAM(S): 20 TABLET, DELAYED RELEASE ORAL at 05:49

## 2020-12-02 RX ADMIN — ZINC SULFATE TAB 220 MG (50 MG ZINC EQUIVALENT) 220 MILLIGRAM(S): 220 (50 ZN) TAB at 21:08

## 2020-12-02 RX ADMIN — Medication 100 MILLIGRAM(S): at 10:04

## 2020-12-02 RX ADMIN — Medication 0.25 MILLIGRAM(S): at 07:27

## 2020-12-02 RX ADMIN — Medication 650 MILLIGRAM(S): at 17:36

## 2020-12-02 RX ADMIN — HEPARIN SODIUM 5000 UNIT(S): 5000 INJECTION INTRAVENOUS; SUBCUTANEOUS at 05:49

## 2020-12-02 RX ADMIN — Medication 12.5 MILLIGRAM(S): at 17:36

## 2020-12-02 RX ADMIN — Medication 81 MILLIGRAM(S): at 11:15

## 2020-12-02 RX ADMIN — Medication 2000 UNIT(S): at 21:07

## 2020-12-02 RX ADMIN — Medication 200 MILLIGRAM(S): at 10:04

## 2020-12-02 RX ADMIN — MONTELUKAST 10 MILLIGRAM(S): 4 TABLET, CHEWABLE ORAL at 21:07

## 2020-12-02 RX ADMIN — Medication 1: at 12:09

## 2020-12-02 RX ADMIN — Medication 1000 MILLIGRAM(S): at 21:07

## 2020-12-02 RX ADMIN — Medication 200 MILLIGRAM(S): at 17:36

## 2020-12-02 NOTE — PROGRESS NOTE ADULT - NEGATIVE NEUROLOGICAL SYMPTOMS
no tremors/no vertigo/no syncope/no loss of sensation/no headache/no loss of consciousness/no difficulty walking

## 2020-12-02 NOTE — PROGRESS NOTE ADULT - SUBJECTIVE AND OBJECTIVE BOX
PGY-1 Progress Note discussed with attending    PAGER #: [[phone]] TILL 5:00 PM  PLEASE CONTACT ON CALL TEAM:  - On Call Team (Please refer to Antonella) FROM 5:00 PM - 8:30PM  - Nightfloat Team FROM 8:30 -7:30 AM    CHIEF COMPLAINT & BRIEF HOSPITAL COURSE:    INTERVAL HPI/OVERNIGHT EVENTS:     REVIEW OF SYSTEMS:  CONSTITUTIONAL: No fever, weight loss, or fatigue  RESPIRATORY: No cough, wheezing, chills or hemoptysis; No shortness of breath  CARDIOVASCULAR: No chest pain, palpitations, dizziness, or leg swelling  GASTROINTESTINAL: No abdominal pain. No nausea, vomiting, or hematemesis; No diarrhea or constipation. No melena or hematochezia.  GENITOURINARY: No dysuria or hematuria, urinary frequency  NEUROLOGICAL: No headaches, memory loss, loss of strength, numbness, or tremors  SKIN: No itching, burning, rashes, or lesions     Vital Signs Last 24 Hrs  T(C): 38.9 (02 Dec 2020 08:13), Max: 38.9 (02 Dec 2020 08:13)  T(F): 102 (02 Dec 2020 08:13), Max: 102 (02 Dec 2020 08:13)  HR: 126 (02 Dec 2020 08:13) (95 - 126)  BP: 112/69 (02 Dec 2020 08:13) (110/71 - 136/83)  BP(mean): --  RR: 18 (02 Dec 2020 08:13) (18 - 18)  SpO2: 94% (02 Dec 2020 08:13) (94% - 98%)    PHYSICAL EXAMINATION:  GENERAL: NAD, well built  HEAD:  Atraumatic, Normocephalic  EYES:  conjunctiva and sclera clear  NECK: Supple, No JVD, Normal thyroid  CHEST/LUNG: Clear to auscultation. Clear to percussion bilaterally; No rales, rhonchi, wheezing, or rubs  HEART: Regular rate and rhythm; No murmurs, rubs, or gallops  ABDOMEN: Soft, Nontender, Nondistended; Bowel sounds present  NERVOUS SYSTEM:  Alert & Oriented X3,    EXTREMITIES:  2+ Peripheral Pulses, No clubbing, cyanosis, or edema  SKIN: warm dry                          11.7   4.36  )-----------( 180      ( 02 Dec 2020 06:50 )             35.6     12-02    135  |  98  |  25<H>  ----------------------------<  141<H>  3.6   |  24  |  1.82<H>    Ca    8.3<L>      02 Dec 2020 06:50  Phos  2.2     12-02  Mg     2.0     12-02    TPro  7.8  /  Alb  3.2<L>  /  TBili  0.7  /  DBili  x   /  AST  64<H>  /  ALT  47  /  AlkPhos  61  12-02    LIVER FUNCTIONS - ( 02 Dec 2020 06:50 )  Alb: 3.2 g/dL / Pro: 7.8 g/dL / ALK PHOS: 61 U/L / ALT: 47 U/L DA / AST: 64 U/L / GGT: x           CARDIAC MARKERS ( 02 Dec 2020 06:50 )  0.823 ng/mL / x     / 528 U/L / x     / 1.7 ng/mL  CARDIAC MARKERS ( 01 Dec 2020 22:06 )  0.898 ng/mL / x     / 503 U/L / x     / 1.8 ng/mL  CARDIAC MARKERS ( 01 Dec 2020 17:27 )  0.304 ng/mL / x     / x     / x     / x      CARDIAC MARKERS ( 01 Dec 2020 08:58 )  0.274 ng/mL / x     / x     / x     / x          PT/INR - ( 01 Dec 2020 09:44 )   PT: 14.5 sec;   INR: 1.23 ratio         PTT - ( 01 Dec 2020 09:44 )  PTT:32.3 sec    CAPILLARY BLOOD GLUCOSE      RADIOLOGY & ADDITIONAL TESTS: PGY-1 Progress Note discussed with attending    PAGER #: [[phone]] TILL 5:00 PM  PLEASE CONTACT ON CALL TEAM:  - On Call Team (Please refer to Antonella) FROM 5:00 PM - 8:30PM  - Nightfloat Team FROM 8:30 -7:30 AM    CHIEF COMPLAINT & BRIEF HOSPITAL COURSE: 49yo M from home lives with family walks independently with PMH pre-DM , HLD , HTN, cardiac attack and stroke (residual left hemianopsia), OA of b/l Knees presented with and episode of witnessed syncope in the morning . pt reports food poisoning associated with multiple watery non bloody diarrhea , poor oral intake for 3 days. pt had his Bp medication with small amount of water during past 3 days. pt  went to the bathroom this AM and had diarrhea, when he stood up he felt dizzy and passed out. LOC for seconds , denies chest pain , palpitation , sob , cold sweats , chills, Nausea , vomiting any focal weakness or numbness. Pt had a history of cardiac attack and stroke ( with residual left peripheral vision loss)  4 years ago, had been on blood thinners for 3 months after cardiac attack ,  follows up with cardiologist regularly , last echo in June 2020 showed cardiac function within normal limits. patient non smoker, denies pedal edema , ARAGON , orthopnea , PND, can walk >10 blocks and goes more than 4 flights of stairs. never had stress test in the past     INTERVAL HPI/OVERNIGHT EVENTS: Pt had no diarrheal episode overnight. Pt had tachycardic episodes, metoprolol 12.5mg bid started.     REVIEW OF SYSTEMS:  CONSTITUTIONAL: No fever, weight loss, or fatigue  RESPIRATORY: No cough, wheezing, chills or hemoptysis; No shortness of breath  CARDIOVASCULAR: No chest pain, palpitations, dizziness, or leg swelling  GASTROINTESTINAL: No abdominal pain. No nausea, vomiting, or hematemesis; No diarrhea or constipation. No melena or hematochezia.  GENITOURINARY: No dysuria or hematuria, urinary frequency  NEUROLOGICAL: No headaches, memory loss, loss of strength, numbness, or tremors  SKIN: No itching, burning, rashes, or lesions     Vital Signs Last 24 Hrs  T(C): 38.9 (02 Dec 2020 08:13), Max: 38.9 (02 Dec 2020 08:13)  T(F): 102 (02 Dec 2020 08:13), Max: 102 (02 Dec 2020 08:13)  HR: 126 (02 Dec 2020 08:13) (95 - 126)  BP: 112/69 (02 Dec 2020 08:13) (110/71 - 136/83)  BP(mean): --  RR: 18 (02 Dec 2020 08:13) (18 - 18)  SpO2: 94% (02 Dec 2020 08:13) (94% - 98%)    PHYSICAL EXAMINATION:  GENERAL: NAD, well built  HEAD:  Atraumatic, Normocephalic  EYES:  conjunctiva and sclera clear  NECK: Supple, No JVD, Normal thyroid  CHEST/LUNG: Clear to auscultation. Clear to percussion bilaterally; No rales, rhonchi, wheezing, or rubs  HEART: Regular rate and rhythm; No murmurs, rubs, or gallops  ABDOMEN: Soft, Nontender, Nondistended; Bowel sounds present  NERVOUS SYSTEM:  Alert & Oriented X3,    EXTREMITIES:  2+ Peripheral Pulses, No clubbing, cyanosis, or edema  SKIN: warm dry                          11.7   4.36  )-----------( 180      ( 02 Dec 2020 06:50 )             35.6     12-02    135  |  98  |  25<H>  ----------------------------<  141<H>  3.6   |  24  |  1.82<H>    Ca    8.3<L>      02 Dec 2020 06:50  Phos  2.2     12-02  Mg     2.0     12-02    TPro  7.8  /  Alb  3.2<L>  /  TBili  0.7  /  DBili  x   /  AST  64<H>  /  ALT  47  /  AlkPhos  61  12-02    LIVER FUNCTIONS - ( 02 Dec 2020 06:50 )  Alb: 3.2 g/dL / Pro: 7.8 g/dL / ALK PHOS: 61 U/L / ALT: 47 U/L DA / AST: 64 U/L / GGT: x           CARDIAC MARKERS ( 02 Dec 2020 06:50 )  0.823 ng/mL / x     / 528 U/L / x     / 1.7 ng/mL  CARDIAC MARKERS ( 01 Dec 2020 22:06 )  0.898 ng/mL / x     / 503 U/L / x     / 1.8 ng/mL  CARDIAC MARKERS ( 01 Dec 2020 17:27 )  0.304 ng/mL / x     / x     / x     / x      CARDIAC MARKERS ( 01 Dec 2020 08:58 )  0.274 ng/mL / x     / x     / x     / x          PT/INR - ( 01 Dec 2020 09:44 )   PT: 14.5 sec;   INR: 1.23 ratio         PTT - ( 01 Dec 2020 09:44 )  PTT:32.3 sec    CAPILLARY BLOOD GLUCOSE      RADIOLOGY & ADDITIONAL TESTS:

## 2020-12-02 NOTE — PROGRESS NOTE ADULT - PROBLEM SELECTOR PLAN 5
LUCI  gina in setting of COVID versus pre renal azotemia   - Check urinalysis, urine lytes (uosm, urine sodium, urine creatinine)   - Nephro Dr Rubio consulted LUCI  gina in setting of COVID versus pre renal azotemia   - Urinalysis is negative  - Urine lytes are normal  Nephro Dr Rubio consulted

## 2020-12-02 NOTE — CONSULT NOTE ADULT - SUBJECTIVE AND OBJECTIVE BOX
PULMONARY CONSULT NOTE      RAJAT GOLD  MRN-[id number]    Patient is a 50y old  Male who presents with a chief complaint of Syncope (02 Dec 2020 10:04)      HISTORY OF PRESENT ILLNESS:    History of Present Illness:  Reason for Admission: Syncope  History of Present Illness:   51yo M from home lives with family walks independently with PMH pre-DM , HLD , HTN, cardiac attack and stroke (residual left hemianopsia), OA of b/l Knees presented with and episode of witnessed syncope in the morning . pt reports food poisoning associated with multiple watery non bloody diarrhea , poor oral intake for 3 days. pt had his Bp medication with small amount of water during past 3 days. pt  went to the bathroom this AM and had diarrhea, when he stood up he felt dizzy and passed out. LOC for seconds , denies chest pain , palpitation , sob , cold sweats , chills, Nausea , vomiting any focal weakness or numbness. 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 patient non smoker, denies pedal edema , ARAGON , orthopnea , PND, can walk >10 blocks and goes more than 4 flights of stairs. never had stress test in the past       Ed course : T1 mildly elevated : 0.274, ECG :NSR , SCr : 2.42 , received 2000cc NS    ECG : NSR   GOC: FULL CODE     Pt is awake, alert ,lying in bed in NAD     MEDICATIONS  (STANDING):  aspirin enteric coated 81 milliGRAM(s) Oral daily  atorvastatin 80 milliGRAM(s) Oral at bedtime  ciprofloxacin   IVPB      ciprofloxacin   IVPB 400 milliGRAM(s) IV Intermittent every 12 hours  glucagon  Injectable 1 milliGRAM(s) IntraMuscular once  heparin   Injectable 5000 Unit(s) SubCutaneous every 8 hours  insulin lispro (ADMELOG) corrective regimen sliding scale   SubCutaneous Before meals and at bedtime  metoprolol tartrate 12.5 milliGRAM(s) Oral two times a day  metroNIDAZOLE  IVPB      metroNIDAZOLE  IVPB 500 milliGRAM(s) IV Intermittent every 8 hours  pantoprazole    Tablet 40 milliGRAM(s) Oral before breakfast      MEDICATIONS  (PRN):  acetaminophen   Tablet .. 650 milliGRAM(s) Oral every 6 hours PRN Temp greater or equal to 38C (100.4F), Mild Pain (1 - 3)      Allergies    No Known Allergies    Intolerances        PAST MEDICAL & SURGICAL HISTORY:  Hypertension        FAMILY HISTORY:      SOCIAL HISTORY  Smoking History:     REVIEW OF SYSTEMS:    CONSTITUTIONAL:  No fevers, chills, sweats    HEENT:  Eyes:  No diplopia or blurred vision. ENT:  No earache, sore throat or runny nose.    CARDIOVASCULAR:  No pressure, squeezing, tightness, or heaviness about the chest; no palpitations.    RESPIRATORY:  Per HPI    GASTROINTESTINAL:  No abdominal pain, nausea, vomiting or diarrhea.    GENITOURINARY:  No dysuria, frequency or urgency.    NEUROLOGIC:  No paresthesias, fasciculations, seizures or weakness.    PSYCHIATRIC:  No disorder of thought or mood.    Vital Signs Last 24 Hrs  T(C): 37.7 (02 Dec 2020 12:03), Max: 38.9 (02 Dec 2020 08:13)  T(F): 99.8 (02 Dec 2020 12:03), Max: 102 (02 Dec 2020 08:13)  HR: 112 (02 Dec 2020 12:03) (95 - 126)  BP: 134/81 (02 Dec 2020 12:03) (110/71 - 136/83)  BP(mean): --  RR: 18 (02 Dec 2020 12:03) (18 - 18)  SpO2: 95% (02 Dec 2020 12:03) (94% - 98%)  I&O's Detail      PHYSICAL EXAMINATION:    GENERAL: The patient is a well-developed, well-nourished _____in no apparent distress.     HEENT: Head is normocephalic and atraumatic. Extraocular muscles are intact. Mucous membranes are moist.     NECK: Supple.     LUNGS: Clear to auscultation without wheezing, rales, or rhonchi. Respirations unlabored    HEART: Regular rate and rhythm without murmur.    ABDOMEN: Soft, nontender, and nondistended.  No hepatosplenomegaly is noted.    EXTREMITIES: Without any cyanosis, clubbing, rash, lesions or edema.    NEUROLOGIC: Grossly intact.      LABS:                        11.7   4.36  )-----------( 180      ( 02 Dec 2020 06:50 )             35.6     12-02    135  |  98  |  25<H>  ----------------------------<  141<H>  3.6   |  24  |  1.82<H>    Ca    8.3<L>      02 Dec 2020 06:50  Phos  2.2     12  Mg     2.0     12    TPro  7.8  /  Alb  3.2<L>  /  TBili  0.7  /  DBili  x   /  AST  64<H>  /  ALT  47  /  AlkPhos  61  12    PT/INR - ( 01 Dec 2020 09:44 )   PT: 14.5 sec;   INR: 1.23 ratio         PTT - ( 01 Dec 2020 09:44 )  PTT:32.3 sec  Urinalysis Basic - ( 01 Dec 2020 17:27 )    Color: Yellow / Appearance: Clear / S.010 / pH: x  Gluc: x / Ketone: Negative  / Bili: Negative / Urobili: Negative   Blood: x / Protein: Negative / Nitrite: Negative   Leuk Esterase: Negative / RBC: 0-2 /HPF / WBC 0-2 /HPF   Sq Epi: x / Non Sq Epi: Occasional /HPF / Bacteria: Negative /HPF        CARDIAC MARKERS ( 02 Dec 2020 06:50 )  0.823 ng/mL / x     / 528 U/L / x     / 1.7 ng/mL  CARDIAC MARKERS ( 01 Dec 2020 22:06 )  0.898 ng/mL / x     / 503 U/L / x     / 1.8 ng/mL  CARDIAC MARKERS ( 01 Dec 2020 17:27 )  0.304 ng/mL / x     / x     / x     / x      CARDIAC MARKERS ( 01 Dec 2020 08:58 )  0.274 ng/mL / x     / x     / x     / x          D-Dimer Assay, Quantitative: 388 ng/mL DDU (20 @ 17:27)    COVID-19 Antibody - for prior infection screening (20 @ 23:03)   COVID-19 IgG Antibody Index: 0.07: Roche ECLIA Total AB (MAGDA)   NOTE: This result index represents a total antibody measurement, which   includes IgG, IgA, and IgM     COVID-19 PCR . (20 @ 09:44)   COVID-19 PCR: Detected: Respiratory Viral Panel with COVID-19 by OFELIA (20 @ 16:59)   Rapid RVP Result: Detected   SARS-CoV-2: Detected:     MICROBIOLOGY:    RADIOLOGY & ADDITIONAL STUDIES:    CXR:  < from: Xray Chest 1 View- PORTABLE-Urgent (Xray Chest 1 View- PORTABLE-Urgent .) (20 @ 14:50) >  IMPRESSION:    No acute infiltrate.    < end of copied text >    Ct scan chest:    ekg;    echo:

## 2020-12-02 NOTE — CONSULT NOTE ADULT - SUBJECTIVE AND OBJECTIVE BOX
Chief complain/HPI  49yo M from home lives with family walks independently with PMH pre-DM , HLD , HTN, cardiac attack and stroke (residual left hemianopsia), OA of b/l Knees presented with and episode of witnessed syncope in the morning . pt reports food poisoning associated with multiple watery non bloody diarrhea , poor oral intake for 3 days. pt had his Bp medication with small amount of water during past 3 days. pt  went to the bathroom this AM and had diarrhea, when he stood up he felt dizzy and passed out. LOC for seconds , denies chest pain , palpitation , sob , cold sweats , chills, Nausea , vomiting any focal weakness or numbness. 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 patient non smoker, denies pedal edema , ARAGON , orthopnea , PND, can walk >10 blocks and goes more than 4 flights of stairs. never had stress test in the past     PAST MEDICAL & SURGICAL HISTORY:  Hypertension    Diabetes type 2, controlled    Essential hypertension    No significant past surgical history        Home Medications Reviewed    Hospital Medications:   MEDICATIONS  (STANDING):  aspirin enteric coated 81 milliGRAM(s) Oral daily  atorvastatin 80 milliGRAM(s) Oral at bedtime  ciprofloxacin   IVPB      ciprofloxacin   IVPB 400 milliGRAM(s) IV Intermittent every 12 hours  glucagon  Injectable 1 milliGRAM(s) IntraMuscular once  heparin   Injectable 5000 Unit(s) SubCutaneous every 8 hours  insulin lispro (ADMELOG) corrective regimen sliding scale   SubCutaneous Before meals and at bedtime  metoprolol tartrate 12.5 milliGRAM(s) Oral two times a day  metroNIDAZOLE  IVPB      metroNIDAZOLE  IVPB 500 milliGRAM(s) IV Intermittent every 8 hours  pantoprazole    Tablet 40 milliGRAM(s) Oral before breakfast    MEDICATIONS  (PRN):  acetaminophen   Tablet .. 650 milliGRAM(s) Oral every 6 hours PRN Temp greater or equal to 38C (100.4F), Mild Pain (1 - 3)      Allergies    No Known Allergies    Intolerances                              11.7   4.36  )-----------( 180      ( 02 Dec 2020 06:50 )             35.6     12-    135  |  98  |  25<H>  ----------------------------<  141<H>  3.6   |  24  |  1.82<H>    Ca    8.3<L>      02 Dec 2020 06:50  Phos  2.2       Mg     2.0         TPro  7.8  /  Alb  3.2<L>  /  TBili  0.7  /  DBili  x   /  AST  64<H>  /  ALT  47  /  AlkPhos  61      PT/INR - ( 01 Dec 2020 09:44 )   PT: 14.5 sec;   INR: 1.23 ratio         PTT - ( 01 Dec 2020 09:44 )  PTT:32.3 sec  Urinalysis Basic - ( 01 Dec 2020 17:27 )    Color: Yellow / Appearance: Clear / S.010 / pH: x  Gluc: x / Ketone: Negative  / Bili: Negative / Urobili: Negative   Blood: x / Protein: Negative / Nitrite: Negative   Leuk Esterase: Negative / RBC: 0-2 /HPF / WBC 0-2 /HPF   Sq Epi: x / Non Sq Epi: Occasional /HPF / Bacteria: Negative /HPF      Potassium, Random Urine: 21 mmol/L ( @ 17:27)  Sodium, Random Urine: 94 mmol/L ( @ 17:27)  Creatinine, Random Urine: 69 mg/dL ( @ 17:27)        RADIOLOGY & ADDITIONAL STUDIES:    SOCIAL HISTORY: Denies ETOh,Smoking,     FAMILY HISTORY:  Family history of cerebrovascular accident (CVA) in father (Father)    Family history of hypertension (Sibling)        REVIEW OF SYSTEMS:  CONSTITUTIONAL: No malaise, No fatigue, No fevers or chills, well developed, no diaphoresis  EYES/ENT: No visual changes;  No vertigo or throat pain   NECK: No pain or stiffness  RESPIRATORY: No cough, wheezing, hemoptysis; No shortness of breath  CARDIOVASCULAR: No chest pain or palpitations. No edema  GASTROINTESTINAL: No abdominal or epigastric pain. No nausea, vomiting, or hematemesis; No diarrhea or constipation. No melena or hematochezia.  GENITOURINARY: No dysuria, frequency, foamy urine, urinary urgency, incontinence or hematuria  NEUROLOGICAL: No numbness or weakness, No tremor  SKIN: No itching, burning, rashes, or lesions   VASCULAR: No claudication  Musculoskeletal: no arthralgia, no myalgia  All other review of systems is negative unless indicated above.    VITALS:  Vital Signs Last 24 Hrs  T(C): 37.7 (02 Dec 2020 12:03), Max: 38.9 (02 Dec 2020 08:13)  T(F): 99.8 (02 Dec 2020 12:03), Max: 102 (02 Dec 2020 08:13)  HR: 112 (02 Dec 2020 12:03) (95 - 126)  BP: 134/81 (02 Dec 2020 12:03) (110/71 - 136/83)  BP(mean): --  RR: 18 (02 Dec 2020 12:03) (18 - 18)  SpO2: 95% (02 Dec 2020 12:03) (94% - 98%)        PHYSICAL EXAM:  Constitutional: NAD  HEENT: anicteric sclera, oropharynx clear, MMM  Neck: No JVD  Respiratory: good air entrance B/L, no wheezes, rales or rhonchi  Cardiovascular: S1, S2, RRR, no pericardial rub, no murmur  Gastrointestinal: BS+, soft, no tenderness, no distension, no bruit  Pelvis: bladder non-distended, no CVA tenderness  Extremities: No cyanosis or clubbing. No peripheral edema  Neurological: A/O x 3, no focal deficits  Psychiatric: Normal mood, normal affect  : No CVA tenderness. No peraza.   Skin: No rashes  Vascular: all pulses present  Access:                     Chief complain/HPI  49yo M from home lives with family walks independently with PMH pre-DM , HLD , HTN, cardiac attack and stroke (residual left hemianopsia), OA of b/l Knees presented with and episode of witnessed syncope in the morning . pt reports food poisoning associated with multiple watery non bloody diarrhea , poor oral intake for 3 days. pt had his Bp medication with small amount of water during past 3 days. pt  went to the bathroom this AM and had diarrhea, when he stood up he felt dizzy and passed out. LOC for seconds , denies chest pain , palpitation , sob , cold sweats , chills, Nausea , vomiting any focal weakness or numbness. 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 patient non smoker, denies pedal edema , ARAGON , orthopnea , PND, can walk >10 blocks and goes more than 4 flights of stairs. never had stress test in the past   History of renal stones but was never told of renal failure.  He continued to take his meds in the last 3 days  Did not take any pain medications.       PAST MEDICAL & SURGICAL HISTORY:  Hypertension    Diabetes type 2, controlled    Essential hypertension    No significant past surgical history        Home Medications Reviewed    Hospital Medications:   MEDICATIONS  (STANDING):  aspirin enteric coated 81 milliGRAM(s) Oral daily  atorvastatin 80 milliGRAM(s) Oral at bedtime  ciprofloxacin   IVPB      ciprofloxacin   IVPB 400 milliGRAM(s) IV Intermittent every 12 hours  glucagon  Injectable 1 milliGRAM(s) IntraMuscular once  heparin   Injectable 5000 Unit(s) SubCutaneous every 8 hours  insulin lispro (ADMELOG) corrective regimen sliding scale   SubCutaneous Before meals and at bedtime  metoprolol tartrate 12.5 milliGRAM(s) Oral two times a day  metroNIDAZOLE  IVPB      metroNIDAZOLE  IVPB 500 milliGRAM(s) IV Intermittent every 8 hours  pantoprazole    Tablet 40 milliGRAM(s) Oral before breakfast    MEDICATIONS  (PRN):  acetaminophen   Tablet .. 650 milliGRAM(s) Oral every 6 hours PRN Temp greater or equal to 38C (100.4F), Mild Pain (1 - 3)      Allergies    No Known Allergies    Intolerances                              11.7   4.36  )-----------( 180      ( 02 Dec 2020 06:50 )             35.6         135  |  98  |  25<H>  ----------------------------<  141<H>  3.6   |  24  |  1.82<H>    Ca    8.3<L>      02 Dec 2020 06:50  Phos  2.2       Mg     2.0         TPro  7.8  /  Alb  3.2<L>  /  TBili  0.7  /  DBili  x   /  AST  64<H>  /  ALT  47  /  AlkPhos  61      PT/INR - ( 01 Dec 2020 09:44 )   PT: 14.5 sec;   INR: 1.23 ratio         PTT - ( 01 Dec 2020 09:44 )  PTT:32.3 sec  Urinalysis Basic - ( 01 Dec 2020 17:27 )    Color: Yellow / Appearance: Clear / S.010 / pH: x  Gluc: x / Ketone: Negative  / Bili: Negative / Urobili: Negative   Blood: x / Protein: Negative / Nitrite: Negative   Leuk Esterase: Negative / RBC: 0-2 /HPF / WBC 0-2 /HPF   Sq Epi: x / Non Sq Epi: Occasional /HPF / Bacteria: Negative /HPF      Potassium, Random Urine: 21 mmol/L ( @ 17:27)  Sodium, Random Urine: 94 mmol/L ( @ 17:27)  Creatinine, Random Urine: 69 mg/dL ( @ 17:27)        RADIOLOGY & ADDITIONAL STUDIES:  `  SOCIAL HISTORY: Denies ETOh,Smoking,     FAMILY HISTORY:  Family history of cerebrovascular accident (CVA) in father (Father)    Family history of hypertension (Sibling)        REVIEW OF SYSTEMS:  CONSTITUTIONAL: No malaise, No fatigue, No fevers or chills, well developed, no diaphoresis  EYES/ENT: No visual changes;  No vertigo or throat pain   NECK: No pain or stiffness  RESPIRATORY: No cough, wheezing, hemoptysis; No shortness of breath  CARDIOVASCULAR: No chest pain or palpitations. No edema  GASTROINTESTINAL: No abdominal or epigastric pain. No nausea, vomiting, or hematemesis; No diarrhea at present. Before admission large amount of BM TID for 3 days.  No vomit and no nausea  GENITOURINARY: No dysuria, frequency, foamy urine, urinary urgency, incontinence or hematuria  NEUROLOGICAL: No numbness or weakness, No tremor  SKIN: No itching, burning, rashes, or lesions   VASCULAR: No claudication      VITALS:  Vital Signs Last 24 Hrs  T(C): 37.7 (02 Dec 2020 12:03), Max: 38.9 (02 Dec 2020 08:13)  T(F): 99.8 (02 Dec 2020 12:03), Max: 102 (02 Dec 2020 08:13)  HR: 112 (02 Dec 2020 12:03) (95 - 126)  BP: 134/81 (02 Dec 2020 12:03) (110/71 - 136/83)  BP(mean): --  RR: 18 (02 Dec 2020 12:03) (18 - 18)  SpO2: 95% (02 Dec 2020 12:03) (94% - 98%)        PHYSICAL EXAM:  Constitutional: NAD  HEENT: anicteric sclera, oropharynx clear, MMM  Neck: No JVD  Respiratory: good air entrance B/L, no wheezes, rales or rhonchi  Cardiovascular: S1, S2, RRR, no pericardial rub, no murmur  Gastrointestinal: BS+, soft, no tenderness, no distension, no bruit  Pelvis: bladder non-distended, no CVA tenderness  Extremities: No cyanosis or clubbing. No peripheral edema

## 2020-12-02 NOTE — PROGRESS NOTE ADULT - PROBLEM SELECTOR PLAN 1
s/p few days of FTT and diarrhea , likely vasovagal due to dehydration  orthostatic sp IVF in ED negative   Tele    echo   Troponin : 0.2>0.3   follow up troponin   carotid doppler   Cardio Dr Miguel consulted s/p few days of FTT and diarrhea , likely vasovagal due to dehydration, orthostatic sp IVF in ED negative   Troponins trended down at T4.  Echo in June'20 showed EF of 50%.  Cardio Dr Miguel consulted

## 2020-12-02 NOTE — PROGRESS NOTE ADULT - PROBLEM SELECTOR PLAN 6
c/w heparin 5000 TID for now , change to Lovenox onc kidney functio improved since Lovenox is Ac of choice in COVID c/w heparin 5000 TID for now , change to Lovenox onc kidney function improved since Lovenox is Ac of choice in COVID

## 2020-12-02 NOTE — PROGRESS NOTE ADULT - ASSESSMENT
49yo M from home lives with family walks independently with PMH pre-DM , HLD , HTN, cardiac attack and stroke (residual left hemianopsia), OA of b/l Knees presented with and episode of witnessed syncope in the morning. admitted for syncope workup likely in setting of Covid infection. 51yo M from home lives with family walks independently with PMH pre-DM , HLD , HTN, cardiac attack and stroke (residual left hemianopsia), OA of b/l Knees presented with and episode of witnessed syncope in the morning. Admitted for COVID infection, syncope and Gastroenteritis.

## 2020-12-02 NOTE — PROGRESS NOTE ADULT - SUBJECTIVE AND OBJECTIVE BOX
[   ] ICU                                          [   ] CCU                                      [  X ] Medical Floor      Patient is comfortable. No new complaints reported, No abdominal pain, N/V, hematemesis, hematochezia, melena, fever, chills, chest pain, SOB, cough or diarrhea reported.      VITALS  Vital Signs Last 24 Hrs  T(C): 38.9 (02 Dec 2020 08:13), Max: 38.9 (02 Dec 2020 08:13)  T(F): 102 (02 Dec 2020 08:13), Max: 102 (02 Dec 2020 08:13)  HR: 126 (02 Dec 2020 08:13) (95 - 126)  BP: 112/69 (02 Dec 2020 08:13) (110/71 - 136/83)   RR: 18 (02 Dec 2020 08:13) (18 - 18)  SpO2: 94% (02 Dec 2020 08:13) (94% - 98%)       MEDICATIONS  (STANDING):  aspirin enteric coated 81 milliGRAM(s) Oral daily  atorvastatin 80 milliGRAM(s) Oral at bedtime  ciprofloxacin   IVPB      ciprofloxacin   IVPB 400 milliGRAM(s) IV Intermittent once  ciprofloxacin   IVPB 400 milliGRAM(s) IV Intermittent every 12 hours  glucagon  Injectable 1 milliGRAM(s) IntraMuscular once  heparin   Injectable 5000 Unit(s) SubCutaneous every 8 hours  insulin lispro (ADMELOG) corrective regimen sliding scale   SubCutaneous Before meals and at bedtime  metoprolol tartrate 12.5 milliGRAM(s) Oral two times a day  metroNIDAZOLE  IVPB      metroNIDAZOLE  IVPB 500 milliGRAM(s) IV Intermittent once  metroNIDAZOLE  IVPB 500 milliGRAM(s) IV Intermittent every 8 hours  pantoprazole    Tablet 40 milliGRAM(s) Oral before breakfast    MEDICATIONS  (PRN):  acetaminophen   Tablet .. 650 milliGRAM(s) Oral every 6 hours PRN Temp greater or equal to 38C (100.4F), Mild Pain (1 - 3)                            11.7   4.36  )-----------( 180      ( 02 Dec 2020 06:50 )             35.6       12-02    135  |  98  |  25<H>  ----------------------------<  141<H>  3.6   |  24  |  1.82<H>    Ca    8.3<L>      02 Dec 2020 06:50  Phos  2.2     12-02  Mg     2.0     12-02    TPro  7.8  /  Alb  3.2<L>  /  TBili  0.7  /  DBili  x   /  AST  64<H>  /  ALT  47  /  AlkPhos  61  12-02      PT/INR - ( 01 Dec 2020 09:44 )   PT: 14.5 sec;   INR: 1.23 ratio         PTT - ( 01 Dec 2020 09:44 )  PTT:32.3 sec

## 2020-12-02 NOTE — PROGRESS NOTE ADULT - ASSESSMENT
1. Gastroenteritis  2. Diarrhea improving   3. Anemia  4. No evidence of acute GI bleeding  5. R/o chronic GI bleeding    Suggestions:    1. Follow up stool study  2. Monitor electrolytes  3. Check stool for occult blood  4. Monitor H/H  5. Transfuse PRBC as needed  6. Advance diet as tolerated  7. Avoid NSAID  8. Protonix daily  9. DVT prophylaxis

## 2020-12-02 NOTE — PROGRESS NOTE ADULT - NEGATIVE OPHTHALMOLOGIC SYMPTOMS
no discharge L/no pain L/no irritation R/no blurred vision R/no diplopia/no lacrimation L/no lacrimation R/no discharge R/no scleral injection R/no blurred vision L/no pain R/no irritation L/no scleral injection L/no photophobia

## 2020-12-02 NOTE — CONSULT NOTE ADULT - SUBJECTIVE AND OBJECTIVE BOX
CHIEF COMPLAINT:Patient is a 50y old  Male who presents with a chief complaint of Syncope (02 Dec 2020 08:45)      HPI:  49 yo M from home lives with family walks independently with PMH CAD-s/p MI,DM , HLD , HTN,  stroke (residual left hemianopsia), OA of b/l Knees presented with and episode of witnessed syncope in the morning . pt reports food poisoning associated with multiple watery non bloody diarrhea , poor oral intake for 3 days. pt had his Bp medication with small amount of water during past 3 days. pt  went to the bathroom this AM and had diarrhea, when he stood up he felt dizzy and passed out. LOC for seconds , denies chest pain , palpitation , sob , cold sweats , chills, Nausea , vomiting any focal weakness or numbness. Pt had a history of cardiac attack and stroke ( with residual left peripheral vision loss)  4 years ago, had been on blood thinners for 3 months after cardiac attack ,  follows up with cardiologist regularly , last echo in June 2020 showed cardiac function within normal limits. patient non smoker, denies pedal edema , ARAGON , orthopnea , PND, can walk >10 blocks and goes more than 4 flights of stairs. never had stress test in the past       Ed course : T1 mildly elevated : 0.274, ECG :NSR , SCr : 2.42 , received 2000cc NS         PAST MEDICAL & SURGICAL HISTORY:  Hypertension  S/P MI  S/P CVA  DM  HTN  Lipid d/o      MEDICATIONS  (STANDING):  aspirin enteric coated 81 milliGRAM(s) Oral daily  atorvastatin 80 milliGRAM(s) Oral at bedtime  dextrose 40% Gel 15 Gram(s) Oral once  dextrose 5%. 1000 milliLiter(s) (50 mL/Hr) IV Continuous <Continuous>  dextrose 5%. 1000 milliLiter(s) (100 mL/Hr) IV Continuous <Continuous>  dextrose 50% Injectable 25 Gram(s) IV Push once  dextrose 50% Injectable 12.5 Gram(s) IV Push once  dextrose 50% Injectable 25 Gram(s) IV Push once  glucagon  Injectable 1 milliGRAM(s) IntraMuscular once  heparin   Injectable 5000 Unit(s) SubCutaneous every 8 hours  insulin lispro (ADMELOG) corrective regimen sliding scale   SubCutaneous Before meals and at bedtime  pantoprazole    Tablet 40 milliGRAM(s) Oral before breakfast    MEDICATIONS  (PRN):  acetaminophen   Tablet .. 650 milliGRAM(s) Oral every 6 hours PRN Temp greater or equal to 38C (100.4F), Mild Pain (1 - 3)      FAMILY HISTORY:No hx of CAD      SOCIAL HISTORY:    [x ] Non-smoker    [x ] Alcohol-denies    Allergies    No Known Allergies    Intolerances    	    REVIEW OF SYSTEMS:  CONSTITUTIONAL: + fever, No weight loss, or fatigue  EYES: No eye pain, visual disturbances, or discharge  ENT:  No difficulty hearing, tinnitus, vertigo; No sinus or throat pain  NECK: No pain or stiffness  RESPIRATORY: No cough, wheezing, chills or hemoptysis; No Shortness of Breath  CARDIOVASCULAR: No chest pain, palpitations, passing out, dizziness, or leg swelling  GASTROINTESTINAL: No abdominal or epigastric pain. No nausea, vomiting, or hematemesis; + diarrhea . No melena or hematochezia.  GENITOURINARY: No dysuria, frequency, hematuria, or incontinence  NEUROLOGICAL: No headaches, memory loss, loss of strength, numbness, or tremors  SKIN: No itching, burning, rashes, or lesions   LYMPH Nodes: No enlarged glands  ENDOCRINE: No heat or cold intolerance; No hair loss  MUSCULOSKELETAL: No joint pain or swelling; No muscle, back, or extremity pain  PSYCHIATRIC: No depression, anxiety, mood swings, or difficulty sleeping  HEME/LYMPH: No easy bruising, or bleeding gums  ALLERGY AND IMMUNOLOGIC: No hives or eczema	    PHYSICAL EXAM:  T(C): 38.9 (12-02-20 @ 08:13), Max: 38.9 (12-02-20 @ 08:13)  HR: 126 (12-02-20 @ 08:13) (95 - 126)  BP: 112/69 (12-02-20 @ 08:13) (110/71 - 136/83)  RR: 18 (12-02-20 @ 08:13) (18 - 18)  SpO2: 94% (12-02-20 @ 08:13) (94% - 98%)  Wt(kg): --  I&O's Summary      Appearance: Normal	  HEENT:   Normal oral mucosa, PERRL, EOMI	  Lymphatic: No lymphadenopathy  Cardiovascular: Normal S1 S2, No JVD, No murmurs, No edema  Respiratory: Lungs clear to auscultation	  Psychiatry: A & O x 3, Mood & affect appropriate  Gastrointestinal:  Soft, Non-tender, + BS	  Skin: No rashes, No ecchymoses, No cyanosis	  Neurologic: Non-focal  Extremities: Normal range of motion, No clubbing, cyanosis or edema  Vascular: Peripheral pulses palpable 2+ bilaterally    	    ECG:  	Sinus tach q anterior leads  	  	  LABS:	 	      CARDIAC MARKERS ( 02 Dec 2020 06:50 )  0.823 ng/mL / x     / 528 U/L / x     / 1.7 ng/mL  CARDIAC MARKERS ( 01 Dec 2020 22:06 )  0.898 ng/mL / x     / 503 U/L / x     / 1.8 ng/mL  CARDIAC MARKERS ( 01 Dec 2020 17:27 )  0.304 ng/mL / x     / x     / x     / x      CARDIAC MARKERS ( 01 Dec 2020 08:58 )  0.274 ng/mL / x     / x     / x     / x                             11.7   4.36  )-----------( 180      ( 02 Dec 2020 06:50 )             35.6     12-02    135  |  98  |  25<H>  ----------------------------<  141<H>  3.6   |  24  |  1.82<H>    Ca    8.3<L>      02 Dec 2020 06:50  Phos  2.2     12-02  Mg     2.0     12-02    TPro  7.8  /  Alb  3.2<L>  /  TBili  0.7  /  DBili  x   /  AST  64<H>  /  ALT  47  /  AlkPhos  61  12-02      Lipid Profile: Cholesterol 93  LDL --  HDL 32        TSH: Thyroid Stimulating Hormone, Serum: 1.02 uU/mL (12-01 @ 12:16)        EXAM:  XR CHEST PORTABLE URGENT 1V                            PROCEDURE DATE:  12/01/2020          INTERPRETATION:  CLINICAL INDICATION: 50 years  Male with syncope.    COMPARISON: None    AP view of the chest demonstrates the lungs to be clear. There is no pleural effusion. There is no pneumothorax.    The heart is normal in size. There is no mediastinal or hilar mass.    The pulmonary vasculature is normal.    Mild thoracic degenerative changes are present.    IMPRESSION:    No acute infiltrate.         COVID+.

## 2020-12-02 NOTE — PROGRESS NOTE ADULT - NEGATIVE MUSCULOSKELETAL SYMPTOMS
no muscle cramps/no muscle weakness/no back pain/no leg pain L/no stiffness/no neck pain/no arm pain L/no arm pain R/no leg pain R

## 2020-12-02 NOTE — PROGRESS NOTE ADULT - PROBLEM SELECTOR PLAN 3
history of heart attach and stroke x4 years ago with residual left parietal hemianopsia   elevated troponin, no ECG changes, likely demand ischemia in setting of COVID  Troponin : 0.2>0.3   follow up troponin   Serial ECG   Echo  Cardiology Dr Miguel history of heart attach and stroke x4 years ago with residual left parietal hemianopsia   Troponins trended down at T4.  Echo in June'20 showed EF of 50%.  Cardiology Dr Miguel

## 2020-12-02 NOTE — PROGRESS NOTE ADULT - RS GEN PE MLT RESP DETAILS PC
no wheezes/airway patent/respirations non-labored/no rhonchi/good air movement/clear to auscultation bilaterally/no rales/breath sounds equal

## 2020-12-02 NOTE — PROGRESS NOTE ADULT - NEGATIVE ENMT SYMPTOMS
no tinnitus/no hearing difficulty/no vertigo/no nasal congestion/no nasal obstruction/no throat pain/no dysphagia/no post-nasal discharge/no nasal discharge/no nose bleeds/no dry mouth/no ear pain/no sinus symptoms/no gum bleeding

## 2020-12-02 NOTE — PROGRESS NOTE ADULT - PROBLEM SELECTOR PLAN 2
likely in the setting of food poisoning versus COVID   last BM this morning  f/u stool study  started on   GI Dr De La Cruz likely in the setting of food poisoning versus COVID   last BM yesterday  f/u stool study  started on   GI Dr De La Cruz

## 2020-12-02 NOTE — PROGRESS NOTE ADULT - NEGATIVE GASTROINTESTINAL SYMPTOMS
no vomiting/no melena/no jaundice/no change in bowel habits/no hematochezia/no hiccoughs/no nausea/no flatulence/no steatorrhea/no constipation/no abdominal pain

## 2020-12-02 NOTE — CONSULT NOTE ADULT - ASSESSMENT
1. Covid-19  - PCR positive.   - CXR noted.  - Continue Vit C, Vit D, Zinc   - Robitussin PRN for cough  - Tylenol PRN for temp   - O2 Supp PRN   - Monitor O2 Sat.  - Monitor labs  - F.U CXR  - DVT and GI PPX     2. Syncope  - May be vasovagal/dehydration given diarrhea  - Fluids  - Tele monitoring  - Echo   - Cardio Eval   - Carotid duplex  - ACS protocol    3. Diarrhea  - Stool Studies  - R/O colitis vs. food poisoning   - Abx  - GI F/U    4. NSTEMI   - Hx of MI and stroke  - R/O demand ischemia as pt with Covid-19  - Monitor labs  - Echo  - Cardio F/U     5. LUCI   - Avoid nephrotoxic drugs  - Monitor labs  - Renal F/U     
51 yo M from home lives with family walks independently with PMH CAD-s/p MI,DM , HLD , HTN,  stroke (residual left hemianopsia), OA of b/l Knees presented with and episode of witnessed syncope ,LUCI,diarrhea,COVID+,Sinus tachycardia.  1.Tele monitoring.  2.LUCI-IVF.  3.Diarrhea-abx,stool studies.  4.CAD-asa,add b blocker,statin.  5.DM-Insulin.  6.COVID+-no treatment CXR-.  7.GI eval noted.  8.Obtain copy of recent echocardiogram from outpatient cardiologist.  9.GI and DVT prophylaxis.
LUCI non oliguric, pre renal due to GI losses and reduced po intake, plus meds like HCTZ and Lisinopril  Renal function improved today, appetite improved. Off Diuretics and Lisinopril.    Increased Troponin level , ischemia on demand due to hypotension and syncopal episode, follow up with cardiology.  Hypophosphatemia, follow electrolytes in am.  `  `  `      
No
A. The etiology for diarrhea in this patient can be due to:  1. Gastroenteritis      - Most likely viral   2. Colitis    B. Anemia      - Etiology multifactorial      - No evidence of acute GI bleeding      - R/o chronic GI bleeding    Suggestions:    1. Check stool for culture, O&P and C> Diff toxin  2. Monitor electrolytes  3. Check stool for occult blood  4. IVF hydration  5. Avoid NSAID  6. Protonix daily  7. DVT prophylaxis

## 2020-12-02 NOTE — PROGRESS NOTE ADULT - PROBLEM SELECTOR PLAN 4
patient with positive covid, spiked fever during admission   CXR   no acute infiltrates    saturating >96% on RA    s/p 2000cc Ns in ED   -Will send ESR, CRP, Procalcitonin and other COVID markers   -Tylenol PRN for fever  -Will start patient on prophylactic Heparin   -continue to trend D-dimer, CRP, LDH, Troponin, Ferritin, CPK.  - will not consider Decadron or Remdesivir since patient is not requiring oxygen supplementation patient with positive covid, spiked fever during admission   CXR   no acute infiltrates    saturating >96% on RA    s/p 2000cc Ns in ED   -Will send ESR, CRP, Procalcitonin and other COVID markers   -Tylenol PRN for fever  -c/w prophylactic Heparin   -continue to trend D-dimer, CRP, LDH, Troponin, Ferritin, CPK.

## 2020-12-03 LAB
ANION GAP SERPL CALC-SCNC: 10 MMOL/L — SIGNIFICANT CHANGE UP (ref 5–17)
BUN SERPL-MCNC: 18 MG/DL — SIGNIFICANT CHANGE UP (ref 7–18)
CALCIUM SERPL-MCNC: 8.3 MG/DL — LOW (ref 8.4–10.5)
CHLORIDE SERPL-SCNC: 96 MMOL/L — SIGNIFICANT CHANGE UP (ref 96–108)
CO2 SERPL-SCNC: 26 MMOL/L — SIGNIFICANT CHANGE UP (ref 22–31)
CREAT SERPL-MCNC: 1.57 MG/DL — HIGH (ref 0.5–1.3)
GLUCOSE BLDC GLUCOMTR-MCNC: 162 MG/DL — HIGH (ref 70–99)
GLUCOSE BLDC GLUCOMTR-MCNC: 176 MG/DL — HIGH (ref 70–99)
GLUCOSE BLDC GLUCOMTR-MCNC: 180 MG/DL — HIGH (ref 70–99)
GLUCOSE BLDC GLUCOMTR-MCNC: 186 MG/DL — HIGH (ref 70–99)
GLUCOSE BLDC GLUCOMTR-MCNC: 192 MG/DL — HIGH (ref 70–99)
GLUCOSE SERPL-MCNC: 178 MG/DL — HIGH (ref 70–99)
HCT VFR BLD CALC: 34.5 % — LOW (ref 39–50)
HGB BLD-MCNC: 11.2 G/DL — LOW (ref 13–17)
MAGNESIUM SERPL-MCNC: 1.8 MG/DL — SIGNIFICANT CHANGE UP (ref 1.6–2.6)
MCHC RBC-ENTMCNC: 28.9 PG — SIGNIFICANT CHANGE UP (ref 27–34)
MCHC RBC-ENTMCNC: 32.5 GM/DL — SIGNIFICANT CHANGE UP (ref 32–36)
MCV RBC AUTO: 89.1 FL — SIGNIFICANT CHANGE UP (ref 80–100)
NRBC # BLD: 0 /100 WBCS — SIGNIFICANT CHANGE UP (ref 0–0)
PHOSPHATE SERPL-MCNC: 1.9 MG/DL — LOW (ref 2.5–4.5)
PLATELET # BLD AUTO: 184 K/UL — SIGNIFICANT CHANGE UP (ref 150–400)
POTASSIUM SERPL-MCNC: 3.8 MMOL/L — SIGNIFICANT CHANGE UP (ref 3.5–5.3)
POTASSIUM SERPL-SCNC: 3.8 MMOL/L — SIGNIFICANT CHANGE UP (ref 3.5–5.3)
RBC # BLD: 3.87 M/UL — LOW (ref 4.2–5.8)
RBC # FLD: 13.3 % — SIGNIFICANT CHANGE UP (ref 10.3–14.5)
SODIUM SERPL-SCNC: 132 MMOL/L — LOW (ref 135–145)
WBC # BLD: 3.61 K/UL — LOW (ref 3.8–10.5)
WBC # FLD AUTO: 3.61 K/UL — LOW (ref 3.8–10.5)

## 2020-12-03 RX ORDER — DEXAMETHASONE 0.5 MG/5ML
6 ELIXIR ORAL DAILY
Refills: 0 | Status: DISCONTINUED | OUTPATIENT
Start: 2020-12-03 | End: 2020-12-05

## 2020-12-03 RX ORDER — METOPROLOL TARTRATE 50 MG
25 TABLET ORAL
Refills: 0 | Status: DISCONTINUED | OUTPATIENT
Start: 2020-12-03 | End: 2020-12-04

## 2020-12-03 RX ADMIN — ZINC SULFATE TAB 220 MG (50 MG ZINC EQUIVALENT) 220 MILLIGRAM(S): 220 (50 ZN) TAB at 12:13

## 2020-12-03 RX ADMIN — Medication 6 MILLIGRAM(S): at 12:13

## 2020-12-03 RX ADMIN — FAMOTIDINE 40 MILLIGRAM(S): 10 INJECTION INTRAVENOUS at 17:21

## 2020-12-03 RX ADMIN — Medication 25 MILLIGRAM(S): at 17:21

## 2020-12-03 RX ADMIN — ATORVASTATIN CALCIUM 80 MILLIGRAM(S): 80 TABLET, FILM COATED ORAL at 22:17

## 2020-12-03 RX ADMIN — FAMOTIDINE 40 MILLIGRAM(S): 10 INJECTION INTRAVENOUS at 05:11

## 2020-12-03 RX ADMIN — HEPARIN SODIUM 5000 UNIT(S): 5000 INJECTION INTRAVENOUS; SUBCUTANEOUS at 05:11

## 2020-12-03 RX ADMIN — HEPARIN SODIUM 5000 UNIT(S): 5000 INJECTION INTRAVENOUS; SUBCUTANEOUS at 22:17

## 2020-12-03 RX ADMIN — Medication 1: at 22:55

## 2020-12-03 RX ADMIN — Medication 650 MILLIGRAM(S): at 05:12

## 2020-12-03 RX ADMIN — Medication 1000 MILLIGRAM(S): at 12:13

## 2020-12-03 RX ADMIN — HEPARIN SODIUM 5000 UNIT(S): 5000 INJECTION INTRAVENOUS; SUBCUTANEOUS at 13:53

## 2020-12-03 RX ADMIN — Medication 2000 UNIT(S): at 12:13

## 2020-12-03 RX ADMIN — Medication 1: at 08:38

## 2020-12-03 RX ADMIN — Medication 1: at 17:20

## 2020-12-03 RX ADMIN — Medication 1: at 12:12

## 2020-12-03 RX ADMIN — Medication 12.5 MILLIGRAM(S): at 05:10

## 2020-12-03 RX ADMIN — Medication 100 MILLIGRAM(S): at 05:10

## 2020-12-03 RX ADMIN — Medication 81 MILLIGRAM(S): at 12:13

## 2020-12-03 RX ADMIN — MONTELUKAST 10 MILLIGRAM(S): 4 TABLET, CHEWABLE ORAL at 22:17

## 2020-12-03 RX ADMIN — Medication 200 MILLIGRAM(S): at 05:10

## 2020-12-03 NOTE — PROGRESS NOTE ADULT - SUBJECTIVE AND OBJECTIVE BOX
PGY-1 Progress Note discussed with attending    PAGER #: [[phone]] TILL 5:00 PM  PLEASE CONTACT ON CALL TEAM:  - On Call Team (Please refer to Antonella) FROM 5:00 PM - 8:30PM  - Nightfloat Team FROM 8:30 -7:30 AM    CHIEF COMPLAINT & BRIEF HOSPITAL COURSE:    INTERVAL HPI/OVERNIGHT EVENTS:     REVIEW OF SYSTEMS:  CONSTITUTIONAL: No fever, weight loss, or fatigue  RESPIRATORY: No cough, wheezing, chills or hemoptysis; No shortness of breath  CARDIOVASCULAR: No chest pain, palpitations, dizziness, or leg swelling  GASTROINTESTINAL: No abdominal pain. No nausea, vomiting, or hematemesis; No diarrhea or constipation. No melena or hematochezia.  GENITOURINARY: No dysuria or hematuria, urinary frequency  NEUROLOGICAL: No headaches, memory loss, loss of strength, numbness, or tremors  SKIN: No itching, burning, rashes, or lesions     Vital Signs Last 24 Hrs  T(C): 36.9 (03 Dec 2020 08:12), Max: 38.6 (02 Dec 2020 15:41)  T(F): 98.4 (03 Dec 2020 08:12), Max: 101.4 (02 Dec 2020 15:41)  HR: 96 (03 Dec 2020 08:12) (96 - 118)  BP: 116/76 (03 Dec 2020 08:12) (111/80 - 134/81)  BP(mean): --  RR: 18 (03 Dec 2020 08:12) (18 - 18)  SpO2: 93% (03 Dec 2020 08:12) (91% - 95%)    PHYSICAL EXAMINATION:  GENERAL: NAD, well built  HEAD:  Atraumatic, Normocephalic  EYES:  conjunctiva and sclera clear  NECK: Supple, No JVD, Normal thyroid  CHEST/LUNG: Clear to auscultation. Clear to percussion bilaterally; No rales, rhonchi, wheezing, or rubs  HEART: Regular rate and rhythm; No murmurs, rubs, or gallops  ABDOMEN: Soft, Nontender, Nondistended; Bowel sounds present  NERVOUS SYSTEM:  Alert & Oriented X3,    EXTREMITIES:  2+ Peripheral Pulses, No clubbing, cyanosis, or edema  SKIN: warm dry                          11.7   4.36  )-----------( 180      ( 02 Dec 2020 06:50 )             35.6     12-02    135  |  98  |  25<H>  ----------------------------<  141<H>  3.6   |  24  |  1.82<H>    Ca    8.3<L>      02 Dec 2020 06:50  Phos  2.2     12-02  Mg     2.0     12-02    TPro  7.8  /  Alb  3.2<L>  /  TBili  0.7  /  DBili  x   /  AST  64<H>  /  ALT  47  /  AlkPhos  61  12-02    LIVER FUNCTIONS - ( 02 Dec 2020 06:50 )  Alb: 3.2 g/dL / Pro: 7.8 g/dL / ALK PHOS: 61 U/L / ALT: 47 U/L DA / AST: 64 U/L / GGT: x           CARDIAC MARKERS ( 02 Dec 2020 06:50 )  0.823 ng/mL / x     / 528 U/L / x     / 1.7 ng/mL  CARDIAC MARKERS ( 01 Dec 2020 22:06 )  0.898 ng/mL / x     / 503 U/L / x     / 1.8 ng/mL  CARDIAC MARKERS ( 01 Dec 2020 17:27 )  0.304 ng/mL / x     / x     / x     / x        CAPILLARY BLOOD GLUCOSE      RADIOLOGY & ADDITIONAL TESTS: PGY-1 Progress Note discussed with attending    PAGER #: [[phone]] TILL 5:00 PM  PLEASE CONTACT ON CALL TEAM:  - On Call Team (Please refer to Antonella) FROM 5:00 PM - 8:30PM  - Nightfloat Team FROM 8:30 -7:30 AM    CHIEF COMPLAINT & BRIEF HOSPITAL COURSE: 49yo M from home lives with family walks independently with PMH pre-DM , HLD , HTN, cardiac attack and stroke (residual left hemianopsia), OA of b/l Knees presented with and episode of witnessed syncope in the morning . pt reports food poisoning associated with multiple watery non bloody diarrhea , poor oral intake for 3 days. pt had his Bp medication with small amount of water during past 3 days. pt  went to the bathroom this AM and had diarrhea, when he stood up he felt dizzy and passed out. LOC for seconds , denies chest pain , palpitation , sob , cold sweats , chills, Nausea , vomiting any focal weakness or numbness. Pt had a history of cardiac attack and stroke ( with residual left peripheral vision loss)  4 years ago, had been on blood thinners for 3 months after cardiac attack ,  follows up with cardiologist regularly , last echo in June 2020 showed cardiac function within normal limits. patient non smoker, denies pedal edema , ARAGON , orthopnea , PND, can walk >10 blocks and goes more than 4 flights of stairs. never had stress test in the past.    INTERVAL HPI/OVERNIGHT EVENTS: Pt had no diarrheal episode overnight, s/p cipro and flagyl. Pt had tachycardic episodes, fever of 100.9 this morning. The metoprolol increased to 25mg bid started. Pt has LUCI, creat 1.57 today.    REVIEW OF SYSTEMS:  CONSTITUTIONAL: No fever, weight loss, or fatigue  RESPIRATORY: No cough, wheezing, chills or hemoptysis; No shortness of breath  CARDIOVASCULAR: No chest pain, palpitations, dizziness, or leg swelling  GASTROINTESTINAL: No abdominal pain. No nausea, vomiting, or hematemesis; No diarrhea or constipation. No melena or hematochezia.  GENITOURINARY: No dysuria or hematuria, urinary frequency  NEUROLOGICAL: No headaches, memory loss, loss of strength, numbness, or tremors  SKIN: No itching, burning, rashes, or lesions     Vital Signs Last 24 Hrs  T(C): 36.9 (03 Dec 2020 08:12), Max: 38.6 (02 Dec 2020 15:41)  T(F): 98.4 (03 Dec 2020 08:12), Max: 101.4 (02 Dec 2020 15:41)  HR: 96 (03 Dec 2020 08:12) (96 - 118)  BP: 116/76 (03 Dec 2020 08:12) (111/80 - 134/81)  BP(mean): --  RR: 18 (03 Dec 2020 08:12) (18 - 18)  SpO2: 93% (03 Dec 2020 08:12) (91% - 95%)    PHYSICAL EXAMINATION:  GENERAL: NAD, well built  HEAD:  Atraumatic, Normocephalic  EYES:  conjunctiva and sclera clear  NECK: Supple, No JVD, Normal thyroid  CHEST/LUNG: Clear to auscultation. Clear to percussion bilaterally; No rales, rhonchi, wheezing, or rubs  HEART: Regular rate and rhythm; No murmurs, rubs, or gallops  ABDOMEN: Soft, Nontender, Nondistended; Bowel sounds present  NERVOUS SYSTEM:  Alert & Oriented X3,    EXTREMITIES:  2+ Peripheral Pulses, No clubbing, cyanosis, or edema  SKIN: warm dry                          11.7   4.36  )-----------( 180      ( 02 Dec 2020 06:50 )             35.6     12-02    135  |  98  |  25<H>  ----------------------------<  141<H>  3.6   |  24  |  1.82<H>    Ca    8.3<L>      02 Dec 2020 06:50  Phos  2.2     12-02  Mg     2.0     12-02    TPro  7.8  /  Alb  3.2<L>  /  TBili  0.7  /  DBili  x   /  AST  64<H>  /  ALT  47  /  AlkPhos  61  12-02    LIVER FUNCTIONS - ( 02 Dec 2020 06:50 )  Alb: 3.2 g/dL / Pro: 7.8 g/dL / ALK PHOS: 61 U/L / ALT: 47 U/L DA / AST: 64 U/L / GGT: x           CARDIAC MARKERS ( 02 Dec 2020 06:50 )  0.823 ng/mL / x     / 528 U/L / x     / 1.7 ng/mL  CARDIAC MARKERS ( 01 Dec 2020 22:06 )  0.898 ng/mL / x     / 503 U/L / x     / 1.8 ng/mL  CARDIAC MARKERS ( 01 Dec 2020 17:27 )  0.304 ng/mL / x     / x     / x     / x        CAPILLARY BLOOD GLUCOSE      RADIOLOGY & ADDITIONAL TESTS:

## 2020-12-03 NOTE — PROGRESS NOTE ADULT - PROBLEM SELECTOR PLAN 2
likely in the setting of food poisoning versus COVID   last BM yesterday  f/u stool study  started on   GI Dr De La Cruz likely in the setting of food poisoning versus COVID   last BM yesterday  stopped cipro and flagyl  GI Dr De La Cruz

## 2020-12-03 NOTE — PROGRESS NOTE ADULT - NEGATIVE OPHTHALMOLOGIC SYMPTOMS
no diplopia/no photophobia/no discharge R/no pain L/no pain R/no irritation L/no scleral injection L/no scleral injection R/no lacrimation R/no discharge L/no lacrimation L/no blurred vision L/no blurred vision R/no irritation R

## 2020-12-03 NOTE — PROGRESS NOTE ADULT - PROBLEM SELECTOR PLAN 5
LUCI  gina in setting of COVID versus pre renal azotemia   - Urinalysis is negative  - Urine lytes are normal  Nephro Dr Rubio consulted LUCI  gina in setting of COVID versus pre renal azotemia   - Urinalysis is negative  - Urine lytes are normal  - Creat today is 1.57  Nephro Dr Rubio consulted

## 2020-12-03 NOTE — PROGRESS NOTE ADULT - ASSESSMENT
49yo M from home lives with family walks independently with PMH pre-DM , HLD , HTN, cardiac attack and stroke (residual left hemianopsia), OA of b/l Knees presented with and episode of witnessed syncope in the morning. Admitted for COVID infection, syncope and Gastroenteritis.

## 2020-12-03 NOTE — PROGRESS NOTE ADULT - NEGATIVE MUSCULOSKELETAL SYMPTOMS
no stiffness/no arm pain R/no muscle cramps/no arm pain L/no back pain/no leg pain R/no neck pain/no leg pain L/no muscle weakness

## 2020-12-03 NOTE — PROGRESS NOTE ADULT - NEGATIVE NEUROLOGICAL SYMPTOMS
no syncope/no tremors/no headache/no loss of sensation/no difficulty walking/no loss of consciousness/no vertigo

## 2020-12-03 NOTE — PROGRESS NOTE ADULT - ASSESSMENT
51 yo M from home lives with family walks independently with PMH CAD-s/p MI,DM , HLD , HTN,  stroke (residual left hemianopsia), OA of b/l Knees presented with and episode of witnessed syncope ,LUCI,diarrhea,COVID+,Sinus tachycardia.  1.Tele monitoring.  2.LUCI-IVF.  3.Diarrhea-abx,stool studies.  4.CAD-asa,inc b blocker to 25mg bid,statin.  5.DM-Insulin.  6.COVID+-no treatment CXR-.  7.GI f/u noted.  8.GI and DVT prophylaxis.

## 2020-12-03 NOTE — PROGRESS NOTE ADULT - SUBJECTIVE AND OBJECTIVE BOX
Pt is awake, alert, lying in bed in NAD. On isolation precautions. Saturating 93%.     INTERVAL HPI/OVERNIGHT EVENTS:      VITAL SIGNS:  T(F): 98.4 (20 @ 08:12)  HR: 96 (20 @ 08:12)  BP: 116/76 (20 @ 08:12)  RR: 18 (20 @ 08:12)  SpO2: 93% (20 @ 08:12)  Wt(kg): --  I&O's Detail          REVIEW OF SYSTEMS:    CONSTITUTIONAL:  No fevers, chills, sweats    HEENT:  Eyes:  No diplopia or blurred vision. ENT:  No earache, sore throat or runny nose.    CARDIOVASCULAR:  No pressure, squeezing, tightness, or heaviness about the chest; no palpitations.    RESPIRATORY:  Per HPI    GASTROINTESTINAL:  No abdominal pain, nausea, vomiting or diarrhea.    GENITOURINARY:  No dysuria, frequency or urgency.    NEUROLOGIC:  No paresthesias, fasciculations, seizures or weakness.    PSYCHIATRIC:  No disorder of thought or mood.      PHYSICAL EXAM:    Constitutional: Well developed and nourished  Eyes:Perrla  ENMT: normal  Neck:supple  Respiratory: good air entry  Cardiovascular: S1 S2 regular  Gastrointestinal: Soft, Non tender  Extremities: No edema  Vascular:normal  Neurological:Awake, alert,Ox3  Musculoskeletal:Normal      MEDICATIONS  (STANDING):  ascorbic acid 1000 milliGRAM(s) Oral daily  aspirin enteric coated 81 milliGRAM(s) Oral daily  atorvastatin 80 milliGRAM(s) Oral at bedtime  cholecalciferol 2000 Unit(s) Oral daily  ciprofloxacin   IVPB      ciprofloxacin   IVPB 400 milliGRAM(s) IV Intermittent every 12 hours  dexAMETHasone  Injectable 6 milliGRAM(s) IV Push daily  famotidine    Tablet 40 milliGRAM(s) Oral two times a day  glucagon  Injectable 1 milliGRAM(s) IntraMuscular once  heparin   Injectable 5000 Unit(s) SubCutaneous every 8 hours  insulin lispro (ADMELOG) corrective regimen sliding scale   SubCutaneous Before meals and at bedtime  metoprolol tartrate 25 milliGRAM(s) Oral two times a day  metroNIDAZOLE  IVPB      metroNIDAZOLE  IVPB 500 milliGRAM(s) IV Intermittent every 8 hours  montelukast 10 milliGRAM(s) Oral at bedtime  zinc sulfate 220 milliGRAM(s) Oral daily    MEDICATIONS  (PRN):  acetaminophen   Tablet .. 650 milliGRAM(s) Oral every 6 hours PRN Temp greater or equal to 38C (100.4F), Mild Pain (1 - 3)      Allergies    No Known Allergies    Intolerances        LABS:                        11.7   4.36  )-----------( 180      ( 02 Dec 2020 06:50 )             35.6     12-    135  |  98  |  25<H>  ----------------------------<  141<H>  3.6   |  24  |  1.82<H>    Ca    8.3<L>      02 Dec 2020 06:50  Phos  2.2     12  Mg     2.0         TPro  7.8  /  Alb  3.2<L>  /  TBili  0.7  /  DBili  x   /  AST  64<H>  /  ALT  47  /  AlkPhos  61  12      Urinalysis Basic - ( 01 Dec 2020 17:27 )    Color: Yellow / Appearance: Clear / S.010 / pH: x  Gluc: x / Ketone: Negative  / Bili: Negative / Urobili: Negative   Blood: x / Protein: Negative / Nitrite: Negative   Leuk Esterase: Negative / RBC: 0-2 /HPF / WBC 0-2 /HPF   Sq Epi: x / Non Sq Epi: Occasional /HPF / Bacteria: Negative /HPF        CARDIAC MARKERS ( 02 Dec 2020 06:50 )  0.823 ng/mL / x     / 528 U/L / x     / 1.7 ng/mL  CARDIAC MARKERS ( 01 Dec 2020 22:06 )  0.898 ng/mL / x     / 503 U/L / x     / 1.8 ng/mL  CARDIAC MARKERS ( 01 Dec 2020 17:27 )  0.304 ng/mL / x     / x     / x     / x          CAPILLARY BLOOD GLUCOSE      POCT Blood Glucose.: 162 mg/dL (03 Dec 2020 08:11)  POCT Blood Glucose.: 118 mg/dL (02 Dec 2020 21:08)  POCT Blood Glucose.: 115 mg/dL (02 Dec 2020 16:37)  POCT Blood Glucose.: 163 mg/dL (02 Dec 2020 11:55)    pro-bnp --  @ 17:27     d-dimer 388   @ 17:27      RADIOLOGY & ADDITIONAL TESTS:    CXR:    Ct scan chest:    ekg;    echo:

## 2020-12-03 NOTE — PROGRESS NOTE ADULT - PROBLEM SELECTOR PLAN 3
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  Echo in June'20 showed EF of 50%.  Cardiology Dr Miguel

## 2020-12-03 NOTE — PROGRESS NOTE ADULT - PROBLEM SELECTOR PLAN 4
patient with positive covid, spiked fever during admission   CXR   no acute infiltrates    saturating >96% on RA    s/p 2000cc Ns in ED   -Will send ESR, CRP, Procalcitonin and other COVID markers   -Tylenol PRN for fever  -c/w prophylactic Heparin   -continue to trend D-dimer, CRP, LDH, Troponin, Ferritin, CPK. patient with positive covid, spiked fever during admission   CXR   no acute infiltrates    saturating >96% on RA    s/p 2000cc Ns in ED   -Will send ESR, CRP, Procalcitonin and other COVID markers   -Tylenol PRN for fever  -c/w prophylactic Heparin

## 2020-12-03 NOTE — PROGRESS NOTE ADULT - NEGATIVE ENMT SYMPTOMS
no vertigo/no gum bleeding/no throat pain/no tinnitus/no nasal discharge/no nasal obstruction/no post-nasal discharge/no nose bleeds/no dry mouth/no ear pain/no hearing difficulty/no sinus symptoms/no dysphagia/no nasal congestion

## 2020-12-03 NOTE — PROGRESS NOTE ADULT - PROBLEM SELECTOR PLAN 1
s/p few days of FTT and diarrhea , likely vasovagal due to dehydration, orthostatic sp IVF in ED negative   Troponins trended down at T4.  Echo in June'20 showed EF of 50%.  Cardio Dr Miguel consulted

## 2020-12-03 NOTE — PROGRESS NOTE ADULT - SUBJECTIVE AND OBJECTIVE BOX
Patient is a 50y old  Male who presents with a chief complaint of Syncope (02 Dec 2020 15:09)    pt seen in tele [ x ], reg med floor [   ], bed [x  ], chair at bedside [   ], a+o x3 [x  ], lethargic [  ],    nad [ x ]        Allergies    No Known Allergies        Vitals    T(F): 100.9 (12-03-20 @ 05:15), Max: 102 (12-02-20 @ 08:13)  HR: 110 (12-03-20 @ 05:15) (100 - 126)  BP: 114/80 (12-03-20 @ 05:15) (111/80 - 134/81)  RR: 18 (12-03-20 @ 05:15) (18 - 18)  SpO2: 93% (12-03-20 @ 05:15) (91% - 95%)  Wt(kg): --  CAPILLARY BLOOD GLUCOSE      POCT Blood Glucose.: 118 mg/dL (02 Dec 2020 21:08)      Labs                          11.7   4.36  )-----------( 180      ( 02 Dec 2020 06:50 )             35.6       12-02    135  |  98  |  25<H>  ----------------------------<  141<H>  3.6   |  24  |  1.82<H>    Ca    8.3<L>      02 Dec 2020 06:50  Phos  2.2     12-02  Mg     2.0     12-02    TPro  7.8  /  Alb  3.2<L>  /  TBili  0.7  /  DBili  x   /  AST  64<H>  /  ALT  47  /  AlkPhos  61  12-02      CARDIAC MARKERS ( 02 Dec 2020 06:50 )  0.823 ng/mL / x     / 528 U/L / x     / 1.7 ng/mL  CARDIAC MARKERS ( 01 Dec 2020 22:06 )  0.898 ng/mL / x     / 503 U/L / x     / 1.8 ng/mL  CARDIAC MARKERS ( 01 Dec 2020 17:27 )  0.304 ng/mL / x     / x     / x     / x      CARDIAC MARKERS ( 01 Dec 2020 08:58 )  0.274 ng/mL / x     / x     / x     / x                Radiology Results      Meds    MEDICATIONS  (STANDING):  ascorbic acid 1000 milliGRAM(s) Oral daily  aspirin enteric coated 81 milliGRAM(s) Oral daily  atorvastatin 80 milliGRAM(s) Oral at bedtime  cholecalciferol 2000 Unit(s) Oral daily  ciprofloxacin   IVPB      ciprofloxacin   IVPB 400 milliGRAM(s) IV Intermittent every 12 hours  famotidine    Tablet 40 milliGRAM(s) Oral two times a day  glucagon  Injectable 1 milliGRAM(s) IntraMuscular once  heparin   Injectable 5000 Unit(s) SubCutaneous every 8 hours  insulin lispro (ADMELOG) corrective regimen sliding scale   SubCutaneous Before meals and at bedtime  metoprolol tartrate 12.5 milliGRAM(s) Oral two times a day  metroNIDAZOLE  IVPB      metroNIDAZOLE  IVPB 500 milliGRAM(s) IV Intermittent every 8 hours  montelukast 10 milliGRAM(s) Oral at bedtime  zinc sulfate 220 milliGRAM(s) Oral daily      MEDICATIONS  (PRN):  acetaminophen   Tablet .. 650 milliGRAM(s) Oral every 6 hours PRN Temp greater or equal to 38C (100.4F), Mild Pain (1 - 3)      Physical Exam    Neuro :  no focal deficits  Respiratory: CTA B/L  CV: RRR, S1S2, no murmurs,   Abdominal: Soft, NT, ND +BS,  Extremities: No edema, + peripheral pulses    ASSESSMENT    Syncope and collapse likely vasovagal,   r/o acs,   r/o arrythmia    gastroenteritis possibly 2nd to food poisoning,   dehydration,   covid 19 infection  h/o  pre-DM ,   HLD ,   HTN,   mi   stroke (residual left hemianopsia),   OA of b/l Knees          PLAN      cont tele,   cont aspirin, statin,   ce q8 x3  cardio cons  f/u echo  start cipro, flagyl,   gi cons.   ivf   f/u stool pcr and cx   id cons  pt covid positive with negative antibody  start vit c, vit d, pepcid, singulair, zinc  O2 sat  (95% - 98%) on room air   will add decadron if O2 sat drops below 94% on ra  O2 via nasal canula if needed  pulm cons  contact and airborne isolation  cont albuterol inhaler   f/u procalcitonin,   F/u D-dimer, esr, crp, ldh, ferritin, lactate,   cont tylenol prn,   cont robitussin prn   cont current meds             Patient is a 50y old  Male who presents with a chief complaint of Syncope (02 Dec 2020 15:09)    pt seen in tele [ x ], reg med floor [   ], bed [x  ], chair at bedside [   ], a+o x3 [x  ], lethargic [  ],    nad [ x ]        Allergies    No Known Allergies        Vitals    T(F): 100.9 (12-03-20 @ 05:15), Max: 102 (12-02-20 @ 08:13)  HR: 110 (12-03-20 @ 05:15) (100 - 126)  BP: 114/80 (12-03-20 @ 05:15) (111/80 - 134/81)  RR: 18 (12-03-20 @ 05:15) (18 - 18)  SpO2: 93% (12-03-20 @ 05:15) (91% - 95%)  Wt(kg): --  CAPILLARY BLOOD GLUCOSE      POCT Blood Glucose.: 118 mg/dL (02 Dec 2020 21:08)      Labs                          11.7   4.36  )-----------( 180      ( 02 Dec 2020 06:50 )             35.6       12-02    135  |  98  |  25<H>  ----------------------------<  141<H>  3.6   |  24  |  1.82<H>    Ca    8.3<L>      02 Dec 2020 06:50  Phos  2.2     12-02  Mg     2.0     12-02    TPro  7.8  /  Alb  3.2<L>  /  TBili  0.7  /  DBili  x   /  AST  64<H>  /  ALT  47  /  AlkPhos  61  12-02      CARDIAC MARKERS ( 02 Dec 2020 06:50 )  0.823 ng/mL / x     / 528 U/L / x     / 1.7 ng/mL  CARDIAC MARKERS ( 01 Dec 2020 22:06 )  0.898 ng/mL / x     / 503 U/L / x     / 1.8 ng/mL  CARDIAC MARKERS ( 01 Dec 2020 17:27 )  0.304 ng/mL / x     / x     / x     / x      CARDIAC MARKERS ( 01 Dec 2020 08:58 )  0.274 ng/mL / x     / x     / x     / x                Radiology Results      Meds    MEDICATIONS  (STANDING):  ascorbic acid 1000 milliGRAM(s) Oral daily  aspirin enteric coated 81 milliGRAM(s) Oral daily  atorvastatin 80 milliGRAM(s) Oral at bedtime  cholecalciferol 2000 Unit(s) Oral daily  ciprofloxacin   IVPB      ciprofloxacin   IVPB 400 milliGRAM(s) IV Intermittent every 12 hours  famotidine    Tablet 40 milliGRAM(s) Oral two times a day  glucagon  Injectable 1 milliGRAM(s) IntraMuscular once  heparin   Injectable 5000 Unit(s) SubCutaneous every 8 hours  insulin lispro (ADMELOG) corrective regimen sliding scale   SubCutaneous Before meals and at bedtime  metoprolol tartrate 12.5 milliGRAM(s) Oral two times a day  metroNIDAZOLE  IVPB      metroNIDAZOLE  IVPB 500 milliGRAM(s) IV Intermittent every 8 hours  montelukast 10 milliGRAM(s) Oral at bedtime  zinc sulfate 220 milliGRAM(s) Oral daily      MEDICATIONS  (PRN):  acetaminophen   Tablet .. 650 milliGRAM(s) Oral every 6 hours PRN Temp greater or equal to 38C (100.4F), Mild Pain (1 - 3)      Physical Exam    Neuro :  no focal deficits  Respiratory: CTA B/L  CV: RRR, S1S2, no murmurs,   Abdominal: Soft, NT, ND +BS,  Extremities: No edema, + peripheral pulses    ASSESSMENT    Syncope and collapse likely vasovagal,   r/o acs,   r/o arrythmia    gastroenteritis possibly 2nd to food poisoning,   dehydration,   covid 19 infection   sepsis  h/o  pre-DM ,   HLD ,   HTN,   mi   stroke (residual left hemianopsia),   OA of b/l Knees          PLAN      cont tele,   cont aspirin, statin,   ce q8 x 4 noted above  cardio f/u   cont lopressor 25 mg daily  obtain echo from outpt provider  cont cipro, flagyl,   gi cons.   ivf   f/u stool pcr and cx   diarrhea resolved   tmx 102  id cons  pt covid positive with negative antibody  cont vit c, vit d, pepcid, singulair, zinc  O2 sat (91% - 95%) on room air   add decadron   O2 via nasal canula if needed  pulm f/u  contact and airborne isolation  cont albuterol inhaler   F/u D-dimer, esr, crp, ldh, ferritin, lactate in am  cont tylenol prn,   cont robitussin prn   cont current meds             Patient is a 50y old  Male who presents with a chief complaint of Syncope (02 Dec 2020 15:09)    pt seen in tele [ x ], reg med floor [   ], bed [x  ], chair at bedside [   ], a+o x3 [x  ], lethargic [  ],    nad [ x ]        Allergies    No Known Allergies        Vitals    T(F): 100.9 (12-03-20 @ 05:15), Max: 102 (12-02-20 @ 08:13)  HR: 110 (12-03-20 @ 05:15) (100 - 126)  BP: 114/80 (12-03-20 @ 05:15) (111/80 - 134/81)  RR: 18 (12-03-20 @ 05:15) (18 - 18)  SpO2: 93% (12-03-20 @ 05:15) (91% - 95%)  Wt(kg): --  CAPILLARY BLOOD GLUCOSE      POCT Blood Glucose.: 118 mg/dL (02 Dec 2020 21:08)      Labs                          11.7   4.36  )-----------( 180      ( 02 Dec 2020 06:50 )             35.6       12-02    135  |  98  |  25<H>  ----------------------------<  141<H>  3.6   |  24  |  1.82<H>    Ca    8.3<L>      02 Dec 2020 06:50  Phos  2.2     12-02  Mg     2.0     12-02    TPro  7.8  /  Alb  3.2<L>  /  TBili  0.7  /  DBili  x   /  AST  64<H>  /  ALT  47  /  AlkPhos  61  12-02      CARDIAC MARKERS ( 02 Dec 2020 06:50 )  0.823 ng/mL / x     / 528 U/L / x     / 1.7 ng/mL  CARDIAC MARKERS ( 01 Dec 2020 22:06 )  0.898 ng/mL / x     / 503 U/L / x     / 1.8 ng/mL  CARDIAC MARKERS ( 01 Dec 2020 17:27 )  0.304 ng/mL / x     / x     / x     / x      CARDIAC MARKERS ( 01 Dec 2020 08:58 )  0.274 ng/mL / x     / x     / x     / x                Radiology Results      Meds    MEDICATIONS  (STANDING):  ascorbic acid 1000 milliGRAM(s) Oral daily  aspirin enteric coated 81 milliGRAM(s) Oral daily  atorvastatin 80 milliGRAM(s) Oral at bedtime  cholecalciferol 2000 Unit(s) Oral daily  ciprofloxacin   IVPB      ciprofloxacin   IVPB 400 milliGRAM(s) IV Intermittent every 12 hours  famotidine    Tablet 40 milliGRAM(s) Oral two times a day  glucagon  Injectable 1 milliGRAM(s) IntraMuscular once  heparin   Injectable 5000 Unit(s) SubCutaneous every 8 hours  insulin lispro (ADMELOG) corrective regimen sliding scale   SubCutaneous Before meals and at bedtime  metoprolol tartrate 12.5 milliGRAM(s) Oral two times a day  metroNIDAZOLE  IVPB      metroNIDAZOLE  IVPB 500 milliGRAM(s) IV Intermittent every 8 hours  montelukast 10 milliGRAM(s) Oral at bedtime  zinc sulfate 220 milliGRAM(s) Oral daily      MEDICATIONS  (PRN):  acetaminophen   Tablet .. 650 milliGRAM(s) Oral every 6 hours PRN Temp greater or equal to 38C (100.4F), Mild Pain (1 - 3)      Physical Exam    Neuro :  no focal deficits  Respiratory: CTA B/L  CV: RRR, S1S2, no murmurs,   Abdominal: Soft, NT, ND +BS,  Extremities: No edema, + peripheral pulses    ASSESSMENT    Syncope and collapse likely vasovagal,   r/o acs,   r/o arrythmia    gastroenteritis possibly 2nd to food poisoning,   dehydration,   covid 19 infection   sepsis  h/o  pre-DM ,   HLD ,   HTN,   mi   stroke (residual left hemianopsia),   OA of b/l Knees          PLAN      cont tele,   cont aspirin, statin,   ce q8 x 4 noted above  cardio f/u   cont lopressor 25 mg daily  obtain echo from outpt provider  cont cipro, flagyl,   gi f/u.   ivf   f/u stool pcr and cx   diarrhea resolved   tmx 102  id cons  pt covid positive with negative antibody  cont vit c, vit d, pepcid, singulair, zinc  O2 sat (91% - 95%) on room air   add decadron   O2 via nasal canula if needed  pulm f/u  contact and airborne isolation  cont albuterol inhaler   F/u D-dimer, esr, crp, ldh, ferritin, lactate in am  cont tylenol prn,   cont robitussin prn   cont current meds

## 2020-12-03 NOTE — PROGRESS NOTE ADULT - NEGATIVE GASTROINTESTINAL SYMPTOMS
no constipation/no abdominal pain/no nausea/no hiccoughs/no flatulence/no vomiting/no melena/no jaundice/no steatorrhea/no change in bowel habits/no hematochezia

## 2020-12-03 NOTE — PROGRESS NOTE ADULT - SUBJECTIVE AND OBJECTIVE BOX
[   ] ICU                                          [   ] CCU                                      [ X  ] Medical Floor      Patient is comfortable. No new complaints reported, No abdominal pain, N/V, hematemesis, hematochezia, melena, fever, chills, chest pain, SOB, cough or diarrhea reported.    VITALS  Vital Signs Last 24 Hrs  T(C): 36.9 (03 Dec 2020 11:41), Max: 38.3 (03 Dec 2020 05:15)  T(F): 98.5 (03 Dec 2020 11:41), Max: 100.9 (03 Dec 2020 05:15)  HR: 113 (03 Dec 2020 11:41) (96 - 116)  BP: 137/85 (03 Dec 2020 11:41) (111/80 - 137/85)   RR: 18 (03 Dec 2020 11:41) (18 - 18)  SpO2: 94% (03 Dec 2020 11:41) (91% - 94%)       MEDICATIONS  (STANDING):  ascorbic acid 1000 milliGRAM(s) Oral daily  aspirin enteric coated 81 milliGRAM(s) Oral daily  atorvastatin 80 milliGRAM(s) Oral at bedtime  cholecalciferol 2000 Unit(s) Oral daily  dexAMETHasone  Injectable 6 milliGRAM(s) IV Push daily  famotidine    Tablet 40 milliGRAM(s) Oral two times a day  glucagon  Injectable 1 milliGRAM(s) IntraMuscular once  heparin   Injectable 5000 Unit(s) SubCutaneous every 8 hours  insulin lispro (ADMELOG) corrective regimen sliding scale   SubCutaneous Before meals and at bedtime  metoprolol tartrate 25 milliGRAM(s) Oral two times a day  montelukast 10 milliGRAM(s) Oral at bedtime  zinc sulfate 220 milliGRAM(s) Oral daily    MEDICATIONS  (PRN):  acetaminophen   Tablet .. 650 milliGRAM(s) Oral every 6 hours PRN Temp greater or equal to 38C (100.4F), Mild Pain (1 - 3)                            11.2   3.61  )-----------( 184      ( 03 Dec 2020 11:05 )             34.5       12-03    132<L>  |  96  |  18  ----------------------------<  178<H>  3.8   |  26  |  1.57<H>    Ca    8.3<L>      03 Dec 2020 11:05  Phos  1.9     12-03  Mg     1.8     12-03    TPro  7.8  /  Alb  3.2<L>  /  TBili  0.7  /  DBili  x   /  AST  64<H>  /  ALT  47  /  AlkPhos  61  12-02

## 2020-12-03 NOTE — PROGRESS NOTE ADULT - PROBLEM SELECTOR PLAN 6
c/w heparin 5000 TID for now , change to Lovenox onc kidney function improved since Lovenox is Ac of choice in COVID

## 2020-12-03 NOTE — PROGRESS NOTE ADULT - SUBJECTIVE AND OBJECTIVE BOX
CHIEF COMPLAINT:Patient is a 50y old  Male who presents with a chief complaint of Syncope.Pt has had no further diarrhea.    	  REVIEW OF SYSTEMS:  CONSTITUTIONAL: No fever, weight loss, or fatigue  EYES: No eye pain, visual disturbances, or discharge  ENT:  No difficulty hearing, tinnitus, vertigo; No sinus or throat pain  NECK: No pain or stiffness  RESPIRATORY: No cough, wheezing, chills or hemoptysis; No Shortness of Breath  CARDIOVASCULAR: No chest pain, palpitations, passing out, dizziness, or leg swelling  GASTROINTESTINAL: No abdominal or epigastric pain. No nausea, vomiting, or hematemesis; No diarrhea or constipation. No melena or hematochezia.  GENITOURINARY: No dysuria, frequency, hematuria, or incontinence  NEUROLOGICAL: No headaches, memory loss, loss of strength, numbness, or tremors  SKIN: No itching, burning, rashes, or lesions   LYMPH Nodes: No enlarged glands  ENDOCRINE: No heat or cold intolerance; No hair loss  MUSCULOSKELETAL: No joint pain or swelling; No muscle, back, or extremity pain  PSYCHIATRIC: No depression, anxiety, mood swings, or difficulty sleeping  HEME/LYMPH: No easy bruising, or bleeding gums  ALLERGY AND IMMUNOLOGIC: No hives or eczema	        PHYSICAL EXAM:  T(C): 36.9 (12-03-20 @ 08:12), Max: 38.6 (12-02-20 @ 15:41)  HR: 96 (12-03-20 @ 08:12) (96 - 118)  BP: 116/76 (12-03-20 @ 08:12) (111/80 - 134/81)  RR: 18 (12-03-20 @ 08:12) (18 - 18)  SpO2: 93% (12-03-20 @ 08:12) (91% - 95%)        Appearance: Normal	  HEENT:   Normal oral mucosa, PERRL, EOMI	  Lymphatic: No lymphadenopathy  Cardiovascular: Normal S1 S2, No JVD, No murmurs, No edema  Respiratory: Lungs clear to auscultation	  Psychiatry: A & O x 3, Mood & affect appropriate  Gastrointestinal:  Soft, Non-tender, + BS	  Skin: No rashes, No ecchymoses, No cyanosis	  Neurologic: Non-focal  Extremities: Normal range of motion, No clubbing, cyanosis or edema  Vascular: Peripheral pulses palpable 2+ bilaterally    MEDICATIONS  (STANDING):  ascorbic acid 1000 milliGRAM(s) Oral daily  aspirin enteric coated 81 milliGRAM(s) Oral daily  atorvastatin 80 milliGRAM(s) Oral at bedtime  cholecalciferol 2000 Unit(s) Oral daily  ciprofloxacin   IVPB      ciprofloxacin   IVPB 400 milliGRAM(s) IV Intermittent every 12 hours  dexAMETHasone  Injectable 6 milliGRAM(s) IV Push daily  famotidine    Tablet 40 milliGRAM(s) Oral two times a day  glucagon  Injectable 1 milliGRAM(s) IntraMuscular once  heparin   Injectable 5000 Unit(s) SubCutaneous every 8 hours  insulin lispro (ADMELOG) corrective regimen sliding scale   SubCutaneous Before meals and at bedtime  metoprolol tartrate 12.5 milliGRAM(s) Oral two times a day  metroNIDAZOLE  IVPB      metroNIDAZOLE  IVPB 500 milliGRAM(s) IV Intermittent every 8 hours  montelukast 10 milliGRAM(s) Oral at bedtime  zinc sulfate 220 milliGRAM(s) Oral daily      TELEMETRY: sinus tach 120's	    	  	  LABS:	 	      CARDIAC MARKERS ( 02 Dec 2020 06:50 )  0.823 ng/mL / x     / 528 U/L / x     / 1.7 ng/mL  CARDIAC MARKERS ( 01 Dec 2020 22:06 )  0.898 ng/mL / x     / 503 U/L / x     / 1.8 ng/mL  CARDIAC MARKERS ( 01 Dec 2020 17:27 )  0.304 ng/mL / x     / x     / x     / x                              11.7   4.36  )-----------( 180      ( 02 Dec 2020 06:50 )             35.6     12-02    135  |  98  |  25<H>  ----------------------------<  141<H>  3.6   |  24  |  1.82<H>    Ca    8.3<L>      02 Dec 2020 06:50  Phos  2.2     12-02  Mg     2.0     12-02    TPro  7.8  /  Alb  3.2<L>  /  TBili  0.7  /  DBili  x   /  AST  64<H>  /  ALT  47  /  AlkPhos  61  12-02    proBNP:   Lipid Profile: Cholesterol 93  LDL --  HDL 32      HgA1c:   TSH: Thyroid Stimulating Hormone, Serum: 1.02 uU/mL (12-01 @ 12:16)      	  Echo report in chart (6/20)-low normal EF 50%,apical hypokinesis.

## 2020-12-03 NOTE — PROGRESS NOTE ADULT - RS GEN PE MLT RESP DETAILS PC
airway patent/breath sounds equal/good air movement/no rhonchi/no wheezes/no rales/respirations non-labored

## 2020-12-03 NOTE — PROGRESS NOTE ADULT - ASSESSMENT
1. Covid-19  - PCR positive.   - CXR noted.  - Continue Vit C, Vit D, Zinc   - Continue Dexamethasone  - Continue Singulair and Pepcid   - Robitussin PRN for cough  - Tylenol PRN for temp   - O2 Supp PRN   - Monitor O2 Sat.  - Monitor labs  - F.U CXR  - DVT and GI PPX     2. Syncope  - May be vasovagal/dehydration given diarrhea  - Fluids  - Tele monitoring  - Echo - pending   - Cardio F/U   - Carotid duplex  - ACS protocol    3. Diarrhea  - Stool Studies  - R/O colitis vs. food poisoning   - Abx  - GI F/U    4. NSTEMI   - Hx of MI and stroke  - R/O demand ischemia as pt with Covid-19  - Monitor labs  - Echo  - Cardio F/U     5. LUCI   - Avoid nephrotoxic drugs  - Monitor labs  - Renal F/U

## 2020-12-04 LAB
ALBUMIN SERPL ELPH-MCNC: 3 G/DL — LOW (ref 3.5–5)
ALP SERPL-CCNC: 58 U/L — SIGNIFICANT CHANGE UP (ref 40–120)
ALT FLD-CCNC: 46 U/L DA — SIGNIFICANT CHANGE UP (ref 10–60)
ANION GAP SERPL CALC-SCNC: 11 MMOL/L — SIGNIFICANT CHANGE UP (ref 5–17)
AST SERPL-CCNC: 60 U/L — HIGH (ref 10–40)
BILIRUB SERPL-MCNC: 0.6 MG/DL — SIGNIFICANT CHANGE UP (ref 0.2–1.2)
BUN SERPL-MCNC: 20 MG/DL — HIGH (ref 7–18)
CALCIUM SERPL-MCNC: 8.7 MG/DL — SIGNIFICANT CHANGE UP (ref 8.4–10.5)
CHLORIDE SERPL-SCNC: 100 MMOL/L — SIGNIFICANT CHANGE UP (ref 96–108)
CO2 SERPL-SCNC: 24 MMOL/L — SIGNIFICANT CHANGE UP (ref 22–31)
CREAT SERPL-MCNC: 1.34 MG/DL — HIGH (ref 0.5–1.3)
CRP SERPL-MCNC: 6.72 MG/DL — HIGH (ref 0–0.4)
D DIMER BLD IA.RAPID-MCNC: 356 NG/ML DDU — HIGH
ERYTHROCYTE [SEDIMENTATION RATE] IN BLOOD: 90 MM/HR — HIGH (ref 0–20)
FERRITIN SERPL-MCNC: 1255 NG/ML — HIGH (ref 30–400)
GLUCOSE BLDC GLUCOMTR-MCNC: 153 MG/DL — HIGH (ref 70–99)
GLUCOSE BLDC GLUCOMTR-MCNC: 181 MG/DL — HIGH (ref 70–99)
GLUCOSE BLDC GLUCOMTR-MCNC: 203 MG/DL — HIGH (ref 70–99)
GLUCOSE BLDC GLUCOMTR-MCNC: 206 MG/DL — HIGH (ref 70–99)
GLUCOSE SERPL-MCNC: 134 MG/DL — HIGH (ref 70–99)
HCT VFR BLD CALC: 35.8 % — LOW (ref 39–50)
HGB BLD-MCNC: 11.8 G/DL — LOW (ref 13–17)
LACTATE SERPL-SCNC: 1.5 MMOL/L — SIGNIFICANT CHANGE UP (ref 0.7–2)
LDH SERPL L TO P-CCNC: 542 U/L — HIGH (ref 120–225)
MAGNESIUM SERPL-MCNC: 2 MG/DL — SIGNIFICANT CHANGE UP (ref 1.6–2.6)
MCHC RBC-ENTMCNC: 29.2 PG — SIGNIFICANT CHANGE UP (ref 27–34)
MCHC RBC-ENTMCNC: 33 GM/DL — SIGNIFICANT CHANGE UP (ref 32–36)
MCV RBC AUTO: 88.6 FL — SIGNIFICANT CHANGE UP (ref 80–100)
NRBC # BLD: 0 /100 WBCS — SIGNIFICANT CHANGE UP (ref 0–0)
PHOSPHATE SERPL-MCNC: 2 MG/DL — LOW (ref 2.5–4.5)
PLATELET # BLD AUTO: 219 K/UL — SIGNIFICANT CHANGE UP (ref 150–400)
POTASSIUM SERPL-MCNC: 4.2 MMOL/L — SIGNIFICANT CHANGE UP (ref 3.5–5.3)
POTASSIUM SERPL-SCNC: 4.2 MMOL/L — SIGNIFICANT CHANGE UP (ref 3.5–5.3)
PROT SERPL-MCNC: 8 G/DL — SIGNIFICANT CHANGE UP (ref 6–8.3)
RBC # BLD: 4.04 M/UL — LOW (ref 4.2–5.8)
RBC # FLD: 13.1 % — SIGNIFICANT CHANGE UP (ref 10.3–14.5)
SARS-COV-2 IGG SERPL IA-ACNC: 3.8 RATIO — HIGH
SARS-COV-2 IGG SERPL QL IA: NEGATIVE — SIGNIFICANT CHANGE UP
SARS-COV-2 IGG SERPL QL IA: POSITIVE
SARS-COV-2 IGM SERPL IA-ACNC: 0.29 RATIO — SIGNIFICANT CHANGE UP
SODIUM SERPL-SCNC: 135 MMOL/L — SIGNIFICANT CHANGE UP (ref 135–145)
WBC # BLD: 4.55 K/UL — SIGNIFICANT CHANGE UP (ref 3.8–10.5)
WBC # FLD AUTO: 4.55 K/UL — SIGNIFICANT CHANGE UP (ref 3.8–10.5)

## 2020-12-04 RX ORDER — CHOLECALCIFEROL (VITAMIN D3) 125 MCG
5000 CAPSULE ORAL DAILY
Refills: 0 | Status: DISCONTINUED | OUTPATIENT
Start: 2020-12-04 | End: 2020-12-05

## 2020-12-04 RX ORDER — METOPROLOL TARTRATE 50 MG
50 TABLET ORAL
Refills: 0 | Status: DISCONTINUED | OUTPATIENT
Start: 2020-12-04 | End: 2020-12-05

## 2020-12-04 RX ADMIN — Medication 25 MILLIGRAM(S): at 06:21

## 2020-12-04 RX ADMIN — ZINC SULFATE TAB 220 MG (50 MG ZINC EQUIVALENT) 220 MILLIGRAM(S): 220 (50 ZN) TAB at 11:17

## 2020-12-04 RX ADMIN — HEPARIN SODIUM 5000 UNIT(S): 5000 INJECTION INTRAVENOUS; SUBCUTANEOUS at 06:21

## 2020-12-04 RX ADMIN — HEPARIN SODIUM 5000 UNIT(S): 5000 INJECTION INTRAVENOUS; SUBCUTANEOUS at 13:46

## 2020-12-04 RX ADMIN — FAMOTIDINE 40 MILLIGRAM(S): 10 INJECTION INTRAVENOUS at 17:12

## 2020-12-04 RX ADMIN — FAMOTIDINE 40 MILLIGRAM(S): 10 INJECTION INTRAVENOUS at 06:23

## 2020-12-04 RX ADMIN — Medication 1000 MILLIGRAM(S): at 11:17

## 2020-12-04 RX ADMIN — Medication 81 MILLIGRAM(S): at 11:17

## 2020-12-04 RX ADMIN — MONTELUKAST 10 MILLIGRAM(S): 4 TABLET, CHEWABLE ORAL at 21:15

## 2020-12-04 RX ADMIN — Medication 1: at 08:06

## 2020-12-04 RX ADMIN — Medication 5000 UNIT(S): at 11:18

## 2020-12-04 RX ADMIN — HEPARIN SODIUM 5000 UNIT(S): 5000 INJECTION INTRAVENOUS; SUBCUTANEOUS at 21:15

## 2020-12-04 RX ADMIN — Medication 2: at 21:15

## 2020-12-04 RX ADMIN — ATORVASTATIN CALCIUM 80 MILLIGRAM(S): 80 TABLET, FILM COATED ORAL at 21:15

## 2020-12-04 RX ADMIN — Medication 1: at 17:11

## 2020-12-04 RX ADMIN — Medication 6 MILLIGRAM(S): at 06:21

## 2020-12-04 RX ADMIN — Medication 2: at 11:52

## 2020-12-04 RX ADMIN — Medication 50 MILLIGRAM(S): at 17:12

## 2020-12-04 NOTE — PROGRESS NOTE ADULT - PROBLEM SELECTOR PLAN 3
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  Echo in June'20 showed EF of 50%.  Cardiology Dr Miguel likely in the setting of food poisoning versus COVID   last BM yesterday  stopped cipro and flagyl  GI Dr De La Cruz

## 2020-12-04 NOTE — PROGRESS NOTE ADULT - PROBLEM SELECTOR PLAN 5
LUCI  gina in setting of COVID versus pre renal azotemia   - Urinalysis is negative  - Urine lytes are normal  - Creat today is 1.57  Nephro Dr Rubio consulted patient with positive covid, spiked fever during admission   CXR   no acute infiltrates    saturating >96% on RA    s/p 2000cc Ns in ED   -Will send ESR, CRP, Procalcitonin and other COVID markers   -Tylenol PRN for fever  -c/w prophylactic Heparin

## 2020-12-04 NOTE — PROGRESS NOTE ADULT - SUBJECTIVE AND OBJECTIVE BOX
Patient is a 50y old  Male who presents with a chief complaint of Syncope (03 Dec 2020 15:46)    pt seen in tele [ x ], reg med floor [   ], bed [x  ], chair at bedside [   ], a+o x3 [x  ], lethargic [  ],    nad [ x ]      Allergies    No Known Allergies        Vitals    T(F): 97.8 (12-04-20 @ 04:52), Max: 99 (12-03-20 @ 15:42)  HR: 112 (12-04-20 @ 04:52) (96 - 118)  BP: 132/94 (12-04-20 @ 04:52) (116/76 - 138/97)  RR: 18 (12-04-20 @ 04:52) (18 - 18)  SpO2: 97% (12-04-20 @ 04:52) (93% - 97%)  Wt(kg): --  CAPILLARY BLOOD GLUCOSE      POCT Blood Glucose.: 180 mg/dL (03 Dec 2020 22:44)      Labs                          11.2   3.61  )-----------( 184      ( 03 Dec 2020 11:05 )             34.5       12-03    132<L>  |  96  |  18  ----------------------------<  178<H>  3.8   |  26  |  1.57<H>    Ca    8.3<L>      03 Dec 2020 11:05  Phos  1.9     12-03  Mg     1.8     12-03    TPro  7.8  /  Alb  3.2<L>  /  TBili  0.7  /  DBili  x   /  AST  64<H>  /  ALT  47  /  AlkPhos  61  12-02      CARDIAC MARKERS ( 02 Dec 2020 06:50 )  0.823 ng/mL / x     / 528 U/L / x     / 1.7 ng/mL            Radiology Results      Meds    MEDICATIONS  (STANDING):  ascorbic acid 1000 milliGRAM(s) Oral daily  aspirin enteric coated 81 milliGRAM(s) Oral daily  atorvastatin 80 milliGRAM(s) Oral at bedtime  cholecalciferol 2000 Unit(s) Oral daily  dexAMETHasone  Injectable 6 milliGRAM(s) IV Push daily  famotidine    Tablet 40 milliGRAM(s) Oral two times a day  glucagon  Injectable 1 milliGRAM(s) IntraMuscular once  heparin   Injectable 5000 Unit(s) SubCutaneous every 8 hours  insulin lispro (ADMELOG) corrective regimen sliding scale   SubCutaneous Before meals and at bedtime  metoprolol tartrate 25 milliGRAM(s) Oral two times a day  montelukast 10 milliGRAM(s) Oral at bedtime  zinc sulfate 220 milliGRAM(s) Oral daily      MEDICATIONS  (PRN):  acetaminophen   Tablet .. 650 milliGRAM(s) Oral every 6 hours PRN Temp greater or equal to 38C (100.4F), Mild Pain (1 - 3)      Physical Exam    Neuro :  no focal deficits  Respiratory: CTA B/L  CV: RRR, S1S2, no murmurs,   Abdominal: Soft, NT, ND +BS,  Extremities: No edema, + peripheral pulses    ASSESSMENT    Syncope and collapse likely vasovagal,   r/o acs,   r/o arrythmia    gastroenteritis possibly 2nd to food poisoning,   dehydration,   covid 19 infection   sepsis  h/o  pre-DM ,   HLD ,   HTN,   mi   stroke (residual left hemianopsia),   OA of b/l Knees          PLAN      cont tele,   cont aspirin, statin,   ce q8 x 4 noted above  cardio f/u   cont lopressor 25 mg daily  obtain echo from outpt provider  cont cipro, flagyl,   gi f/u.   ivf   f/u stool pcr and cx   diarrhea resolved   tmx 102  id cons  pt covid positive with negative antibody  cont vit c, vit d, pepcid, singulair, zinc  O2 sat (91% - 95%) on room air   add decadron   O2 via nasal canula if needed  pulm f/u  contact and airborne isolation  cont albuterol inhaler   F/u D-dimer, esr, crp, ldh, ferritin, lactate in am  cont tylenol prn,   cont robitussin prn   cont current meds       Patient is a 50y old  Male who presents with a chief complaint of Syncope (03 Dec 2020 15:46)    pt seen in tele [ x ], reg med floor [   ], bed [x  ], chair at bedside [   ], a+o x3 [x  ], lethargic [  ],    nad [ x ]      Allergies    No Known Allergies        Vitals    T(F): 97.8 (12-04-20 @ 04:52), Max: 99 (12-03-20 @ 15:42)  HR: 112 (12-04-20 @ 04:52) (96 - 118)  BP: 132/94 (12-04-20 @ 04:52) (116/76 - 138/97)  RR: 18 (12-04-20 @ 04:52) (18 - 18)  SpO2: 97% (12-04-20 @ 04:52) (93% - 97%)  Wt(kg): --  CAPILLARY BLOOD GLUCOSE      POCT Blood Glucose.: 180 mg/dL (03 Dec 2020 22:44)      Labs                          11.2   3.61  )-----------( 184      ( 03 Dec 2020 11:05 )             34.5       12-03    132<L>  |  96  |  18  ----------------------------<  178<H>  3.8   |  26  |  1.57<H>    Ca    8.3<L>      03 Dec 2020 11:05  Phos  1.9     12-03  Mg     1.8     12-03    TPro  7.8  /  Alb  3.2<L>  /  TBili  0.7  /  DBili  x   /  AST  64<H>  /  ALT  47  /  AlkPhos  61  12-02      CARDIAC MARKERS ( 02 Dec 2020 06:50 )  0.823 ng/mL / x     / 528 U/L / x     / 1.7 ng/mL            Radiology Results      Meds    MEDICATIONS  (STANDING):  ascorbic acid 1000 milliGRAM(s) Oral daily  aspirin enteric coated 81 milliGRAM(s) Oral daily  atorvastatin 80 milliGRAM(s) Oral at bedtime  cholecalciferol 2000 Unit(s) Oral daily  dexAMETHasone  Injectable 6 milliGRAM(s) IV Push daily  famotidine    Tablet 40 milliGRAM(s) Oral two times a day  glucagon  Injectable 1 milliGRAM(s) IntraMuscular once  heparin   Injectable 5000 Unit(s) SubCutaneous every 8 hours  insulin lispro (ADMELOG) corrective regimen sliding scale   SubCutaneous Before meals and at bedtime  metoprolol tartrate 25 milliGRAM(s) Oral two times a day  montelukast 10 milliGRAM(s) Oral at bedtime  zinc sulfate 220 milliGRAM(s) Oral daily      MEDICATIONS  (PRN):  acetaminophen   Tablet .. 650 milliGRAM(s) Oral every 6 hours PRN Temp greater or equal to 38C (100.4F), Mild Pain (1 - 3)      Physical Exam    Neuro :  no focal deficits  Respiratory: CTA B/L  CV: RRR, S1S2, no murmurs,   Abdominal: Soft, NT, ND +BS,  Extremities: No edema, + peripheral pulses    ASSESSMENT    Syncope and collapse likely vasovagal,   r/o acs,   r/o arrythmia    gastroenteritis possibly 2nd to food poisoning,   dehydration,   covid 19 infection   sepsis  h/o  pre-DM ,   HLD ,   HTN,   mi   stroke (residual left hemianopsia),   OA of b/l Knees          PLAN      cont tele,   cont aspirin, statin,   ce q8 x 4 noted  cardio f/u   cont lopressor 25 mg bid  obtain echo from outpt provider  gi f/u.   ivf   f/u stool pcr and cx   diarrhea resolved   cipro, flagyl d/c'd,   tmx 99  pt covid positive with negative antibody  cont vit c, vit d, pepcid, singulair, zinc  O2 sat (93% - 97%) on room air   cont decadron   O2 via nasal canula if needed  pulm f/u  contact and airborne isolation  cont albuterol inhaler   F/u D-dimer, esr, crp, ldh, ferritin, lactate    cont tylenol prn,   cont robitussin prn   cont current meds

## 2020-12-04 NOTE — PROGRESS NOTE ADULT - NEGATIVE OPHTHALMOLOGIC SYMPTOMS
no irritation L/no blurred vision L/no discharge L/no blurred vision R/no discharge R/no pain L/no irritation R/no photophobia/no lacrimation L/no lacrimation R/no pain R/no scleral injection L/no diplopia/no scleral injection R

## 2020-12-04 NOTE — PROGRESS NOTE ADULT - SUBJECTIVE AND OBJECTIVE BOX
Pt is awake, alert, lying in bed in NAD.     INTERVAL HPI/OVERNIGHT EVENTS:    VITAL SIGNS:  T(F): 98.3 (12-04-20 @ 11:46)  HR: 101 (12-04-20 @ 11:46)  BP: 119/89 (12-04-20 @ 11:46)  RR: 17 (12-04-20 @ 11:46)  SpO2: 95% (12-04-20 @ 11:46)  Wt(kg): --  I&O's Detail    REVIEW OF SYSTEMS:    CONSTITUTIONAL:  No fevers, chills, sweats    HEENT:  Eyes:  No diplopia or blurred vision. ENT:  No earache, sore throat or runny nose.    CARDIOVASCULAR:  No pressure, squeezing, tightness, or heaviness about the chest; no palpitations.    RESPIRATORY:  Per HPI    GASTROINTESTINAL:  No abdominal pain, nausea, vomiting or diarrhea.    GENITOURINARY:  No dysuria, frequency or urgency.    NEUROLOGIC:  No paresthesias, fasciculations, seizures or weakness.    PSYCHIATRIC:  No disorder of thought or mood.    PHYSICAL EXAM:    Constitutional: Well developed and nourished  Eyes: Perrla  ENMT: normal  Neck: supple  Respiratory: good air entry  Cardiovascular: S1 S2 regular  Gastrointestinal: Soft, Non tender  Extremities: No edema  Vascular: normal  Neurological: Awake, alert,Ox3  Musculoskeletal: Normal    MEDICATIONS  (STANDING):  ascorbic acid 1000 milliGRAM(s) Oral daily  aspirin enteric coated 81 milliGRAM(s) Oral daily  atorvastatin 80 milliGRAM(s) Oral at bedtime  cholecalciferol 5000 Unit(s) Oral daily  dexAMETHasone  Injectable 6 milliGRAM(s) IV Push daily  famotidine    Tablet 40 milliGRAM(s) Oral two times a day  glucagon  Injectable 1 milliGRAM(s) IntraMuscular once  heparin   Injectable 5000 Unit(s) SubCutaneous every 8 hours  insulin lispro (ADMELOG) corrective regimen sliding scale   SubCutaneous Before meals and at bedtime  metoprolol tartrate 50 milliGRAM(s) Oral two times a day  montelukast 10 milliGRAM(s) Oral at bedtime  zinc sulfate 220 milliGRAM(s) Oral daily    MEDICATIONS  (PRN):  acetaminophen   Tablet .. 650 milliGRAM(s) Oral every 6 hours PRN Temp greater or equal to 38C (100.4F), Mild Pain (1 - 3)      Allergies    No Known Allergies    Intolerances        LABS:                        11.8   4.55  )-----------( 219      ( 04 Dec 2020 07:49 )             35.8     12-04    135  |  100  |  20<H>  ----------------------------<  134<H>  4.2   |  24  |  1.34<H>    Ca    8.7      04 Dec 2020 07:49  Phos  2.0     12-04  Mg     2.0     12-04    TPro  8.0  /  Alb  3.0<L>  /  TBili  0.6  /  DBili  x   /  AST  60<H>  /  ALT  46  /  AlkPhos  58  12-04    CAPILLARY BLOOD GLUCOSE    POCT Blood Glucose.: 203 mg/dL (04 Dec 2020 11:19)  POCT Blood Glucose.: 153 mg/dL (04 Dec 2020 07:46)  POCT Blood Glucose.: 180 mg/dL (03 Dec 2020 22:44)  POCT Blood Glucose.: 186 mg/dL (03 Dec 2020 21:10)  POCT Blood Glucose.: 176 mg/dL (03 Dec 2020 16:36)    pro-bnp -- 12-04 @ 10:43     d-dimer 356  12-04 @ 10:43  pro-bnp -- 12-01 @ 17:27     d-dimer 388  12-01 @ 17:27      RADIOLOGY & ADDITIONAL TESTS:    CXR:    Ct scan chest:    ekg;    echo:

## 2020-12-04 NOTE — PROGRESS NOTE ADULT - PROBLEM SELECTOR PLAN 2
likely in the setting of food poisoning versus COVID   last BM yesterday  stopped cipro and flagyl  GI Dr De La Cruz Pt has had multiple tachycardic episodes  Increased metoprolol to 50mg bid.

## 2020-12-04 NOTE — PROGRESS NOTE ADULT - SUBJECTIVE AND OBJECTIVE BOX
PGY-1 Progress Note discussed with attending    PAGER #: [[phone]] TILL 5:00 PM  PLEASE CONTACT ON CALL TEAM:  - On Call Team (Please refer to Antonella) FROM 5:00 PM - 8:30PM  - Nightfloat Team FROM 8:30 -7:30 AM    CHIEF COMPLAINT & BRIEF HOSPITAL COURSE:    INTERVAL HPI/OVERNIGHT EVENTS:     REVIEW OF SYSTEMS:  CONSTITUTIONAL: No fever, weight loss, or fatigue  RESPIRATORY: No cough, wheezing, chills or hemoptysis; No shortness of breath  CARDIOVASCULAR: No chest pain, palpitations, dizziness, or leg swelling  GASTROINTESTINAL: No abdominal pain. No nausea, vomiting, or hematemesis; No diarrhea or constipation. No melena or hematochezia.  GENITOURINARY: No dysuria or hematuria, urinary frequency  NEUROLOGICAL: No headaches, memory loss, loss of strength, numbness, or tremors  SKIN: No itching, burning, rashes, or lesions     Vital Signs Last 24 Hrs  T(C): 36.8 (04 Dec 2020 07:59), Max: 37.2 (03 Dec 2020 15:42)  T(F): 98.3 (04 Dec 2020 07:59), Max: 99 (03 Dec 2020 15:42)  HR: 95 (04 Dec 2020 07:59) (95 - 118)  BP: 132/83 (04 Dec 2020 07:59) (123/91 - 138/97)  BP(mean): --  RR: 18 (04 Dec 2020 07:59) (18 - 18)  SpO2: 96% (04 Dec 2020 07:59) (94% - 97%)    PHYSICAL EXAMINATION:  GENERAL: NAD, well built  HEAD:  Atraumatic, Normocephalic  EYES:  conjunctiva and sclera clear  NECK: Supple, No JVD, Normal thyroid  CHEST/LUNG: Clear to auscultation. Clear to percussion bilaterally; No rales, rhonchi, wheezing, or rubs  HEART: Regular rate and rhythm; No murmurs, rubs, or gallops  ABDOMEN: Soft, Nontender, Nondistended; Bowel sounds present  NERVOUS SYSTEM:  Alert & Oriented X3,    EXTREMITIES:  2+ Peripheral Pulses, No clubbing, cyanosis, or edema  SKIN: warm dry                          11.8   4.55  )-----------( 219      ( 04 Dec 2020 07:49 )             35.8     12-04    135  |  100  |  20<H>  ----------------------------<  134<H>  4.2   |  24  |  1.34<H>    Ca    8.7      04 Dec 2020 07:49  Phos  2.0     12-04  Mg     2.0     12-04    TPro  8.0  /  Alb  3.0<L>  /  TBili  0.6  /  DBili  x   /  AST  60<H>  /  ALT  46  /  AlkPhos  58  12-04    LIVER FUNCTIONS - ( 04 Dec 2020 07:49 )  Alb: 3.0 g/dL / Pro: 8.0 g/dL / ALK PHOS: 58 U/L / ALT: 46 U/L DA / AST: 60 U/L / GGT: x                   CAPILLARY BLOOD GLUCOSE      RADIOLOGY & ADDITIONAL TESTS:                   PGY-1 Progress Note discussed with attending    PAGER #: [[phone]] TILL 5:00 PM  PLEASE CONTACT ON CALL TEAM:  - On Call Team (Please refer to Antonella) FROM 5:00 PM - 8:30PM  - Nightfloat Team FROM 8:30 -7:30 AM    CHIEF COMPLAINT & BRIEF HOSPITAL COURSE: 51yo M from home lives with family walks independently with PMH pre-DM , HLD , HTN, cardiac attack and stroke (residual left hemianopsia), OA of b/l Knees presented with and episode of witnessed syncope in the morning . pt reports food poisoning associated with multiple watery non bloody diarrhea , poor oral intake for 3 days. pt had his Bp medication with small amount of water during past 3 days. pt  went to the bathroom this AM and had diarrhea, when he stood up he felt dizzy and passed out. LOC for seconds , denies chest pain , palpitation , sob , cold sweats , chills, Nausea , vomiting any focal weakness or numbness. Pt had a history of cardiac attack and stroke ( with residual left peripheral vision loss)  4 years ago, had been on blood thinners for 3 months after cardiac attack ,  follows up with cardiologist regularly , last echo in June 2020 showed cardiac function within normal limits. patient non smoker, denies pedal edema , ARAGON , orthopnea , PND, can walk >10 blocks and goes more than 4 flights of stairs. never had stress test in the past.    INTERVAL HPI/OVERNIGHT EVENTS: Pt had no diarrheal episode overnight, s/p cipro and flagyl. Pt had tachycardic episodes, increased metoprolol to 50mg bid started. Pt has LUCI, improving now at creat 1.34 today.    REVIEW OF SYSTEMS:  CONSTITUTIONAL: No fever, weight loss, or fatigue  RESPIRATORY: No cough, wheezing, chills or hemoptysis; No shortness of breath  CARDIOVASCULAR: No chest pain, palpitations, dizziness, or leg swelling  GASTROINTESTINAL: No abdominal pain. No nausea, vomiting, or hematemesis; No diarrhea or constipation. No melena or hematochezia.  GENITOURINARY: No dysuria or hematuria, urinary frequency  NEUROLOGICAL: No headaches, memory loss, loss of strength, numbness, or tremors  SKIN: No itching, burning, rashes, or lesions     Vital Signs Last 24 Hrs  T(C): 36.8 (04 Dec 2020 07:59), Max: 37.2 (03 Dec 2020 15:42)  T(F): 98.3 (04 Dec 2020 07:59), Max: 99 (03 Dec 2020 15:42)  HR: 95 (04 Dec 2020 07:59) (95 - 118)  BP: 132/83 (04 Dec 2020 07:59) (123/91 - 138/97)  BP(mean): --  RR: 18 (04 Dec 2020 07:59) (18 - 18)  SpO2: 96% (04 Dec 2020 07:59) (94% - 97%)    PHYSICAL EXAMINATION:  GENERAL: NAD, well built  HEAD:  Atraumatic, Normocephalic  EYES:  conjunctiva and sclera clear  NECK: Supple, No JVD, Normal thyroid  CHEST/LUNG: Clear to auscultation. Clear to percussion bilaterally; No rales, rhonchi, wheezing, or rubs  HEART: Regular rate and rhythm; No murmurs, rubs, or gallops  ABDOMEN: Soft, Nontender, Nondistended; Bowel sounds present  NERVOUS SYSTEM:  Alert & Oriented X3,    EXTREMITIES:  2+ Peripheral Pulses, No clubbing, cyanosis, or edema  SKIN: warm dry                          11.8   4.55  )-----------( 219      ( 04 Dec 2020 07:49 )             35.8     12-04    135  |  100  |  20<H>  ----------------------------<  134<H>  4.2   |  24  |  1.34<H>    Ca    8.7      04 Dec 2020 07:49  Phos  2.0     12-04  Mg     2.0     12-04    TPro  8.0  /  Alb  3.0<L>  /  TBili  0.6  /  DBili  x   /  AST  60<H>  /  ALT  46  /  AlkPhos  58  12-04    LIVER FUNCTIONS - ( 04 Dec 2020 07:49 )  Alb: 3.0 g/dL / Pro: 8.0 g/dL / ALK PHOS: 58 U/L / ALT: 46 U/L DA / AST: 60 U/L / GGT: x           CAPILLARY BLOOD GLUCOSE    RADIOLOGY & ADDITIONAL TESTS:

## 2020-12-04 NOTE — PROGRESS NOTE ADULT - SUBJECTIVE AND OBJECTIVE BOX
CHIEF COMPLAINT:Patient is a 50y old  Male who presents with a chief complaint of Syncope.Pt appears comfortable.    	  REVIEW OF SYSTEMS:  CONSTITUTIONAL: No fever, weight loss, or fatigue  EYES: No eye pain, visual disturbances, or discharge  ENT:  No difficulty hearing, tinnitus, vertigo; No sinus or throat pain  NECK: No pain or stiffness  RESPIRATORY: No cough, wheezing, chills or hemoptysis; No Shortness of Breath  CARDIOVASCULAR: No chest pain, palpitations, passing out, dizziness, or leg swelling  GASTROINTESTINAL: No abdominal or epigastric pain. No nausea, vomiting, or hematemesis; No diarrhea or constipation. No melena or hematochezia.  GENITOURINARY: No dysuria, frequency, hematuria, or incontinence  NEUROLOGICAL: No headaches, memory loss, loss of strength, numbness, or tremors  SKIN: No itching, burning, rashes, or lesions   LYMPH Nodes: No enlarged glands  ENDOCRINE: No heat or cold intolerance; No hair loss  MUSCULOSKELETAL: No joint pain or swelling; No muscle, back, or extremity pain  PSYCHIATRIC: No depression, anxiety, mood swings, or difficulty sleeping  HEME/LYMPH: No easy bruising, or bleeding gums  ALLERGY AND IMMUNOLOGIC: No hives or eczema	      PHYSICAL EXAM:  T(C): 36.8 (12-04-20 @ 07:59), Max: 37.2 (12-03-20 @ 15:42)  HR: 95 (12-04-20 @ 07:59) (95 - 118)  BP: 132/83 (12-04-20 @ 07:59) (123/91 - 138/97)  RR: 18 (12-04-20 @ 07:59) (18 - 18)  SpO2: 96% (12-04-20 @ 07:59) (94% - 97%)        Appearance: Normal	  HEENT:   Normal oral mucosa, PERRL, EOMI	  Lymphatic: No lymphadenopathy  Cardiovascular: Normal S1 S2, No JVD, No murmurs, No edema  Respiratory: Lungs clear to auscultation	  Psychiatry: A & O x 3, Mood & affect appropriate  Gastrointestinal:  Soft, Non-tender, + BS	  Skin: No rashes, No ecchymoses, No cyanosis	  Neurologic: Non-focal  Extremities: Normal range of motion, No clubbing, cyanosis or edema  Vascular: Peripheral pulses palpable 2+ bilaterally    MEDICATIONS  (STANDING):  ascorbic acid 1000 milliGRAM(s) Oral daily  aspirin enteric coated 81 milliGRAM(s) Oral daily  atorvastatin 80 milliGRAM(s) Oral at bedtime  cholecalciferol 5000 Unit(s) Oral daily  dexAMETHasone  Injectable 6 milliGRAM(s) IV Push daily  famotidine    Tablet 40 milliGRAM(s) Oral two times a day  glucagon  Injectable 1 milliGRAM(s) IntraMuscular once  heparin   Injectable 5000 Unit(s) SubCutaneous every 8 hours  insulin lispro (ADMELOG) corrective regimen sliding scale   SubCutaneous Before meals and at bedtime  metoprolol tartrate 25 milliGRAM(s) Oral two times a day  montelukast 10 milliGRAM(s) Oral at bedtime  zinc sulfate 220 milliGRAM(s) Oral daily      TELEMETRY: 	sinus tach upto 140's      	  LABS:	 	                      11.8   4.55  )-----------( 219      ( 04 Dec 2020 07:49 )             35.8     12-04    135  |  100  |  20<H>  ----------------------------<  134<H>  4.2   |  24  |  1.34<H>    Ca    8.7      04 Dec 2020 07:49  Phos  2.0     12-04  Mg     2.0     12-04    TPro  8.0  /  Alb  3.0<L>  /  TBili  0.6  /  DBili  x   /  AST  60<H>  /  ALT  46  /  AlkPhos  58  12-04      Lipid Profile: Cholesterol 93  LDL --  HDL 32        TSH: Thyroid Stimulating Hormone, Serum: 1.02 uU/mL (12-01 @ 12:16)

## 2020-12-04 NOTE — PROGRESS NOTE ADULT - SUBJECTIVE AND OBJECTIVE BOX
[   ] ICU                                          [   ] CCU                                      [  X ] Medical Floor      Patient is comfortable. No new complaints reported, No abdominal pain, N/V, hematemesis, hematochezia, melena, fever, chills, chest pain, SOB, cough or diarrhea reported.    VITALS  Vital Signs Last 24 Hrs  T(C): 37.3 (04 Dec 2020 15:21), Max: 37.3 (04 Dec 2020 15:21)  T(F): 99.2 (04 Dec 2020 15:21), Max: 99.2 (04 Dec 2020 15:21)  HR: 112 (04 Dec 2020 15:21) (95 - 118)  BP: 143/85 (04 Dec 2020 15:21) (119/89 - 143/85)   RR: 18 (04 Dec 2020 15:21) (17 - 18)  SpO2: 95% (04 Dec 2020 15:21) (95% - 97%)         MEDICATIONS  (STANDING):  ascorbic acid 1000 milliGRAM(s) Oral daily  aspirin enteric coated 81 milliGRAM(s) Oral daily  atorvastatin 80 milliGRAM(s) Oral at bedtime  cholecalciferol 5000 Unit(s) Oral daily  dexAMETHasone  Injectable 6 milliGRAM(s) IV Push daily  famotidine    Tablet 40 milliGRAM(s) Oral two times a day  glucagon  Injectable 1 milliGRAM(s) IntraMuscular once  heparin   Injectable 5000 Unit(s) SubCutaneous every 8 hours  insulin lispro (ADMELOG) corrective regimen sliding scale   SubCutaneous Before meals and at bedtime  metoprolol tartrate 50 milliGRAM(s) Oral two times a day  montelukast 10 milliGRAM(s) Oral at bedtime  zinc sulfate 220 milliGRAM(s) Oral daily    MEDICATIONS  (PRN):  acetaminophen   Tablet .. 650 milliGRAM(s) Oral every 6 hours PRN Temp greater or equal to 38C (100.4F), Mild Pain (1 - 3)                            11.8   4.55  )-----------( 219      ( 04 Dec 2020 07:49 )             35.8       12-04    135  |  100  |  20<H>  ----------------------------<  134<H>  4.2   |  24  |  1.34<H>    Ca    8.7      04 Dec 2020 07:49  Phos  2.0     12-04  Mg     2.0     12-04    TPro  8.0  /  Alb  3.0<L>  /  TBili  0.6  /  DBili  x   /  AST  60<H>  /  ALT  46  /  AlkPhos  58  12-04

## 2020-12-04 NOTE — PROGRESS NOTE ADULT - NEGATIVE GASTROINTESTINAL SYMPTOMS
no nausea/no constipation/no abdominal pain/no vomiting/no melena/no jaundice/no hematochezia/no steatorrhea/no hiccoughs/no change in bowel habits/no flatulence

## 2020-12-04 NOTE — PROGRESS NOTE ADULT - PROBLEM SELECTOR PLAN 6
c/w heparin 5000 TID for now , change to Lovenox onc kidney function improved since Lovenox is Ac of choice in COVID LUCI  gina in setting of COVID versus pre renal azotemia   - Urinalysis is negative  - Urine lytes are normal  - Creat today is 1.57  Nephro Dr Rubio consulted

## 2020-12-04 NOTE — PROGRESS NOTE ADULT - ASSESSMENT
49 yo M from home lives with family walks independently with PMH CAD-s/p MI,DM , HLD , HTN,  stroke (residual left hemianopsia), OA of b/l Knees presented with and episode of witnessed syncope ,LUCI,diarrhea,COVID+,Sinus tachycardia.  1.D/C Tele monitoring.  2.LUCI resolving, d/c IVF.  3.Diarrhea-abx,stool studies.  4.CAD-asa,inc b blocker to 50mg bid,statin.  5.DM-Insulin.  6.COVID+-no treatment CXR-.  7.GI f/u noted.  8.GI and DVT prophylaxis.

## 2020-12-04 NOTE — PROGRESS NOTE ADULT - NEGATIVE ENMT SYMPTOMS
no nasal discharge/no post-nasal discharge/no nose bleeds/no dry mouth/no gum bleeding/no vertigo/no sinus symptoms/no ear pain/no tinnitus/no hearing difficulty/no throat pain/no dysphagia/no nasal congestion/no nasal obstruction

## 2020-12-04 NOTE — PROGRESS NOTE ADULT - NEGATIVE MUSCULOSKELETAL SYMPTOMS
no muscle weakness/no back pain/no muscle cramps/no neck pain/no leg pain L/no stiffness/no arm pain R/no arm pain L/no leg pain R

## 2020-12-04 NOTE — PROGRESS NOTE ADULT - NEUROLOGICAL DETAILS
cranial nerves intact/responds to pain/responds to verbal commands
responds to verbal commands/cranial nerves intact/responds to pain
sensation intact/cranial nerves intact/responds to verbal commands/responds to pain

## 2020-12-04 NOTE — PROGRESS NOTE ADULT - ASSESSMENT
49yo M from home lives with family walks independently with PMH pre-DM , HLD , HTN, cardiac attack and stroke (residual left hemianopsia), OA of b/l Knees presented with and episode of witnessed syncope in the morning. Admitted for COVID infection, syncope, tachycardia and Gastroenteritis.

## 2020-12-04 NOTE — PROGRESS NOTE ADULT - NEGATIVE NEUROLOGICAL SYMPTOMS
no tremors/no headache/no vertigo/no loss of sensation/no difficulty walking/no syncope/no loss of consciousness

## 2020-12-04 NOTE — PROGRESS NOTE ADULT - PROBLEM SELECTOR PLAN 4
patient with positive covid, spiked fever during admission   CXR   no acute infiltrates    saturating >96% on RA    s/p 2000cc Ns in ED   -Will send ESR, CRP, Procalcitonin and other COVID markers   -Tylenol PRN for fever  -c/w prophylactic Heparin history of heart attach and stroke x4 years ago with residual left parietal hemianopsia   Troponins trended down at T4.  Echo in June'20 showed EF of 50%.  Cardiology Dr Miguel

## 2020-12-04 NOTE — PROGRESS NOTE ADULT - ASSESSMENT
1. Covid-19  - PCR positive.   - CXR noted.  - Continue Vit C, Vit D, Zinc   - Continue Dexamethasone  - Continue Singulair and Pepcid   - Robitussin PRN for cough  - Tylenol PRN for temp   - O2 Supp PRN   - Monitor O2 Sat.  - Monitor labs  - F.U CXR  - DVT and GI PPX     2. Syncope  - May be vasovagal/dehydration given diarrhea  - Fluids  - Tele monitoring  - Echo - pending   - Cardio F/U   - Carotid duplex  - ACS protocol    3. Diarrhea  - Abx  - F/U stool studies   - GI F/U    4. NSTEMI   - Hx of MI and stroke  - R/O demand ischemia as pt with Covid-19  - Monitor labs  - Cardio F/U     5. LUCI   - Avoid nephrotoxic drugs  - Monitor labs  - Renal F/U    6. Tachycardia  - Control HR   - Cardio F/U   - Continue meds

## 2020-12-05 ENCOUNTER — TRANSCRIPTION ENCOUNTER (OUTPATIENT)
Age: 50
End: 2020-12-05

## 2020-12-05 VITALS
OXYGEN SATURATION: 95 % | SYSTOLIC BLOOD PRESSURE: 134 MMHG | RESPIRATION RATE: 22 BRPM | DIASTOLIC BLOOD PRESSURE: 84 MMHG | TEMPERATURE: 99 F | HEART RATE: 104 BPM

## 2020-12-05 LAB
ANION GAP SERPL CALC-SCNC: 10 MMOL/L — SIGNIFICANT CHANGE UP (ref 5–17)
BUN SERPL-MCNC: 26 MG/DL — HIGH (ref 7–18)
CALCIUM SERPL-MCNC: 8.8 MG/DL — SIGNIFICANT CHANGE UP (ref 8.4–10.5)
CHLORIDE SERPL-SCNC: 101 MMOL/L — SIGNIFICANT CHANGE UP (ref 96–108)
CO2 SERPL-SCNC: 24 MMOL/L — SIGNIFICANT CHANGE UP (ref 22–31)
CREAT SERPL-MCNC: 1.41 MG/DL — HIGH (ref 0.5–1.3)
GLUCOSE BLDC GLUCOMTR-MCNC: 240 MG/DL — HIGH (ref 70–99)
GLUCOSE SERPL-MCNC: 155 MG/DL — HIGH (ref 70–99)
HCT VFR BLD CALC: 35.9 % — LOW (ref 39–50)
HGB BLD-MCNC: 11.7 G/DL — LOW (ref 13–17)
MAGNESIUM SERPL-MCNC: 2.1 MG/DL — SIGNIFICANT CHANGE UP (ref 1.6–2.6)
MCHC RBC-ENTMCNC: 28.7 PG — SIGNIFICANT CHANGE UP (ref 27–34)
MCHC RBC-ENTMCNC: 32.6 GM/DL — SIGNIFICANT CHANGE UP (ref 32–36)
MCV RBC AUTO: 88.2 FL — SIGNIFICANT CHANGE UP (ref 80–100)
NRBC # BLD: 0 /100 WBCS — SIGNIFICANT CHANGE UP (ref 0–0)
PHOSPHATE SERPL-MCNC: 3.5 MG/DL — SIGNIFICANT CHANGE UP (ref 2.5–4.5)
PLATELET # BLD AUTO: 277 K/UL — SIGNIFICANT CHANGE UP (ref 150–400)
POTASSIUM SERPL-MCNC: 4.4 MMOL/L — SIGNIFICANT CHANGE UP (ref 3.5–5.3)
POTASSIUM SERPL-SCNC: 4.4 MMOL/L — SIGNIFICANT CHANGE UP (ref 3.5–5.3)
RBC # BLD: 4.07 M/UL — LOW (ref 4.2–5.8)
RBC # FLD: 13.2 % — SIGNIFICANT CHANGE UP (ref 10.3–14.5)
SODIUM SERPL-SCNC: 135 MMOL/L — SIGNIFICANT CHANGE UP (ref 135–145)
WBC # BLD: 8.05 K/UL — SIGNIFICANT CHANGE UP (ref 3.8–10.5)
WBC # FLD AUTO: 8.05 K/UL — SIGNIFICANT CHANGE UP (ref 3.8–10.5)

## 2020-12-05 PROCEDURE — 83615 LACTATE (LD) (LDH) ENZYME: CPT

## 2020-12-05 PROCEDURE — 99285 EMERGENCY DEPT VISIT HI MDM: CPT

## 2020-12-05 PROCEDURE — 85730 THROMBOPLASTIN TIME PARTIAL: CPT

## 2020-12-05 PROCEDURE — 82306 VITAMIN D 25 HYDROXY: CPT

## 2020-12-05 PROCEDURE — 87635 SARS-COV-2 COVID-19 AMP PRB: CPT

## 2020-12-05 PROCEDURE — 86359 T CELLS TOTAL COUNT: CPT

## 2020-12-05 PROCEDURE — 84484 ASSAY OF TROPONIN QUANT: CPT

## 2020-12-05 PROCEDURE — 71045 X-RAY EXAM CHEST 1 VIEW: CPT

## 2020-12-05 PROCEDURE — 80048 BASIC METABOLIC PNL TOTAL CA: CPT

## 2020-12-05 PROCEDURE — 84133 ASSAY OF URINE POTASSIUM: CPT

## 2020-12-05 PROCEDURE — 83036 HEMOGLOBIN GLYCOSYLATED A1C: CPT

## 2020-12-05 PROCEDURE — 80053 COMPREHEN METABOLIC PANEL: CPT

## 2020-12-05 PROCEDURE — 85027 COMPLETE CBC AUTOMATED: CPT

## 2020-12-05 PROCEDURE — 82962 GLUCOSE BLOOD TEST: CPT

## 2020-12-05 PROCEDURE — 80061 LIPID PANEL: CPT

## 2020-12-05 PROCEDURE — 85025 COMPLETE CBC W/AUTO DIFF WBC: CPT

## 2020-12-05 PROCEDURE — 85379 FIBRIN DEGRADATION QUANT: CPT

## 2020-12-05 PROCEDURE — 81001 URINALYSIS AUTO W/SCOPE: CPT

## 2020-12-05 PROCEDURE — 86360 T CELL ABSOLUTE COUNT/RATIO: CPT

## 2020-12-05 PROCEDURE — 85652 RBC SED RATE AUTOMATED: CPT

## 2020-12-05 PROCEDURE — 83605 ASSAY OF LACTIC ACID: CPT

## 2020-12-05 PROCEDURE — 86769 SARS-COV-2 COVID-19 ANTIBODY: CPT

## 2020-12-05 PROCEDURE — 93005 ELECTROCARDIOGRAM TRACING: CPT

## 2020-12-05 PROCEDURE — 84443 ASSAY THYROID STIM HORMONE: CPT

## 2020-12-05 PROCEDURE — 82728 ASSAY OF FERRITIN: CPT

## 2020-12-05 PROCEDURE — 85610 PROTHROMBIN TIME: CPT

## 2020-12-05 PROCEDURE — 86140 C-REACTIVE PROTEIN: CPT

## 2020-12-05 PROCEDURE — 84300 ASSAY OF URINE SODIUM: CPT

## 2020-12-05 PROCEDURE — 83550 IRON BINDING TEST: CPT

## 2020-12-05 PROCEDURE — 84100 ASSAY OF PHOSPHORUS: CPT

## 2020-12-05 PROCEDURE — 82570 ASSAY OF URINE CREATININE: CPT

## 2020-12-05 PROCEDURE — 83540 ASSAY OF IRON: CPT

## 2020-12-05 PROCEDURE — 83735 ASSAY OF MAGNESIUM: CPT

## 2020-12-05 PROCEDURE — 82746 ASSAY OF FOLIC ACID SERUM: CPT

## 2020-12-05 PROCEDURE — 82607 VITAMIN B-12: CPT

## 2020-12-05 PROCEDURE — 36415 COLL VENOUS BLD VENIPUNCTURE: CPT

## 2020-12-05 PROCEDURE — 82553 CREATINE MB FRACTION: CPT

## 2020-12-05 PROCEDURE — 82550 ASSAY OF CK (CPK): CPT

## 2020-12-05 PROCEDURE — 0225U NFCT DS DNA&RNA 21 SARSCOV2: CPT

## 2020-12-05 RX ORDER — METOPROLOL TARTRATE 50 MG
1 TABLET ORAL
Qty: 60 | Refills: 0
Start: 2020-12-05 | End: 2021-01-03

## 2020-12-05 RX ORDER — FAMOTIDINE 10 MG/ML
1 INJECTION INTRAVENOUS
Qty: 30 | Refills: 0
Start: 2020-12-05 | End: 2020-12-19

## 2020-12-05 RX ORDER — LISINOPRIL/HYDROCHLOROTHIAZIDE 10-12.5 MG
1 TABLET ORAL
Qty: 0 | Refills: 0 | DISCHARGE

## 2020-12-05 RX ORDER — MONTELUKAST 4 MG/1
1 TABLET, CHEWABLE ORAL
Qty: 30 | Refills: 0
Start: 2020-12-05 | End: 2021-01-03

## 2020-12-05 RX ORDER — AMLODIPINE BESYLATE 2.5 MG/1
1 TABLET ORAL
Qty: 0 | Refills: 0 | DISCHARGE

## 2020-12-05 RX ORDER — ZINC SULFATE TAB 220 MG (50 MG ZINC EQUIVALENT) 220 (50 ZN) MG
1 TAB ORAL
Qty: 15 | Refills: 0
Start: 2020-12-05 | End: 2020-12-19

## 2020-12-05 RX ORDER — PANTOPRAZOLE SODIUM 20 MG/1
1 TABLET, DELAYED RELEASE ORAL
Qty: 0 | Refills: 0 | DISCHARGE

## 2020-12-05 RX ORDER — METOPROLOL TARTRATE 50 MG
0 TABLET ORAL
Qty: 0 | Refills: 0 | DISCHARGE

## 2020-12-05 RX ADMIN — Medication 50 MILLIGRAM(S): at 05:56

## 2020-12-05 RX ADMIN — Medication 2: at 12:12

## 2020-12-05 RX ADMIN — FAMOTIDINE 40 MILLIGRAM(S): 10 INJECTION INTRAVENOUS at 05:56

## 2020-12-05 RX ADMIN — Medication 6 MILLIGRAM(S): at 05:56

## 2020-12-05 RX ADMIN — Medication 1000 MILLIGRAM(S): at 12:23

## 2020-12-05 RX ADMIN — ZINC SULFATE TAB 220 MG (50 MG ZINC EQUIVALENT) 220 MILLIGRAM(S): 220 (50 ZN) TAB at 12:23

## 2020-12-05 RX ADMIN — HEPARIN SODIUM 5000 UNIT(S): 5000 INJECTION INTRAVENOUS; SUBCUTANEOUS at 05:56

## 2020-12-05 RX ADMIN — Medication 81 MILLIGRAM(S): at 12:23

## 2020-12-05 NOTE — DISCHARGE NOTE PROVIDER - NSDCMRMEDTOKEN_GEN_ALL_CORE_FT
aspirin 81 mg oral delayed release tablet: 1 tab(s) orally once a day  Aspirin Enteric Coated 81 mg oral delayed release tablet: 1 tab(s) orally once a day  atorvastatin 80 mg oral tablet: 1 tab(s) orally once a day (at bedtime)  atorvastatin 80 mg oral tablet: 1 tab(s) orally once a day  Coumadin 5 mg oral tablet: 1 tab(s) orally once a day  FeroSul 325 mg (65 mg elemental iron) oral tablet: 1 tab(s) orally 3 times a day  lisinopril 5 mg oral tablet: 1 tab(s) orally once a day  lisinopril-hydrochlorothiazide 20 mg-25 mg oral tablet: 1 tab(s) orally once a day  metFORMIN 500 mg oral tablet: 1 tab(s) orally 2 times a day  metFORMIN 500 mg oral tablet: 1 tab(s) orally 2 times a day  metoprolol succinate 100 mg oral tablet, extended release: 1 tab(s) orally once a day  metoprolol succinate 200 mg oral tablet, extended release:   Norvasc 10 mg oral tablet: 1 tab(s) orally once a day  pantoprazole 40 mg oral delayed release tablet: 1 tab(s) orally once a day  pantoprazole 40 mg oral delayed release tablet: 1 tab(s) orally once a day (before a meal)  Vitamin C 25 mg oral tablet, chewable: 1 tab(s) orally once a day   aspirin 81 mg oral delayed release tablet: 1 tab(s) orally once a day  atorvastatin 80 mg oral tablet: 1 tab(s) orally once a day  dexamethasone 6 mg oral tablet: 1 tab(s) orally once a day for 2 more days  famotidine 40 mg oral tablet: 1 tab(s) orally 2 times a day  FeroSul 325 mg (65 mg elemental iron) oral tablet: 1 tab(s) orally 3 times a day  metFORMIN 500 mg oral tablet: 1 tab(s) orally 2 times a day  metoprolol tartrate 50 mg oral tablet: 1 tab(s) orally 2 times a day  montelukast 10 mg oral tablet: 1 tab(s) orally once a day (at bedtime)  Vitamin C 25 mg oral tablet, chewable: 1 tab(s) orally once a day  zinc sulfate 220 mg oral capsule: 1 cap(s) orally once a day

## 2020-12-05 NOTE — PROGRESS NOTE ADULT - MS EXT PE MLT D E PC
no cyanosis/no clubbing
no pedal edema/no cyanosis/no clubbing

## 2020-12-05 NOTE — PROGRESS NOTE ADULT - GASTROINTESTINAL DETAILS
no distention/bowel sounds normal/no bruit/soft/nontender/no masses palpable/no organomegaly/no guarding/no rebound tenderness/no rigidity
no bruit/no rebound tenderness/no rigidity/soft/nontender/no guarding/bowel sounds normal
no bruit/nontender/no rebound tenderness/no rigidity/no distention/no guarding/soft/no masses palpable/bowel sounds normal
no rebound tenderness/no rigidity/no masses palpable/bowel sounds normal/no bruit/soft/no guarding/no distention/nontender

## 2020-12-05 NOTE — PROGRESS NOTE ADULT - ASSESSMENT
1. Gastroenteritis  2. Diarrhea improving   3. Anemia  4. No evidence of acute GI bleeding  5. R/o chronic GI bleeding    Suggestions:    1. Follow up stool study  2. Monitor electrolytes  3. Protonix daily   4. Monitor H/H  5. Transfuse PRBC as needed  6. Diet as tolerated  7. Avoid NSAID  8. DVT prophylaxis

## 2020-12-05 NOTE — PROGRESS NOTE ADULT - ASSESSMENT
1. Covid-19  - PCR positive.   - CXR noted.  - Continue Vit C, Vit D, Zinc   - Continue Dexamethasone  - Continue Singulair and Pepcid   - Robitussin PRN for cough  - Tylenol PRN for temp   - O2 Supp PRN   - Monitor O2 Sat.  - Monitor LFTs, LDH, Ferritin, D-Dimer, CRP and procalcitonin  - F.U CXR  - DVT and GI PPX     2. Syncope  - May be vasovagal/dehydration given diarrhea  - Fluids  - Tele monitoring  - Echo - pending   - Cardio F/U   - Carotid duplex  - ACS protocol    3. Diarrhea  - Abx  - F/U stool studies   - GI F/U    4. NSTEMI   - Hx of MI and stroke  - R/O demand ischemia as pt with Covid-19  - Monitor labs  - Cardio F/U     5. LUCI   - Avoid nephrotoxic drugs  - Monitor labs  - Renal F/U    6. Tachycardia  - Control HR   - Cardio F/U   - Continue meds

## 2020-12-05 NOTE — PROGRESS NOTE ADULT - NEGATIVE NEUROLOGICAL SYMPTOMS
no syncope/no loss of consciousness/no headache/no tremors/no vertigo/no loss of sensation/no difficulty walking

## 2020-12-05 NOTE — PROGRESS NOTE ADULT - NEGATIVE GASTROINTESTINAL SYMPTOMS
no nausea/no constipation/no abdominal pain/no hematochezia/no hiccoughs/no change in bowel habits/no flatulence/no melena/no steatorrhea/no jaundice/no vomiting

## 2020-12-05 NOTE — DISCHARGE NOTE PROVIDER - HOSPITAL COURSE
49yo M from home lives with family walks independently with PMH pre-DM , HLD , HTN, cardiac attack and stroke (residual left hemianopsia), OA of b/l Knees presented with and episode of witnessed syncope in the morning . pt reports food poisoning associated with multiple watery non bloody diarrhea , poor oral intake for 3 days. pt had his Bp medication with small amount of water during past 3 days. pt  went to the bathroom this AM and had diarrhea, when he stood up he felt dizzy and passed out. LOC for seconds , denies chest pain , palpitation , sob , cold sweats , chills, Nausea , vomiting any focal weakness or numbness. Pt had a history of cardiac attack and stroke ( with residual left peripheral vision loss)  4 years ago, had been on blood thinners for 3 months after cardiac attack ,  follows up with cardiologist regularly , last echo in June 2020 showed cardiac function within normal limits. patient non smoker, denies pedal edema , ARAGON , orthopnea , PND, can walk >10 blocks and goes more than 4 flights of stairs. never had stress test in the past.    For the diarrhea, pt has had no diarrheal episodes since Thursday (12/3), s/p cipro and flagyl. GI Dr. De La Cruz followed. Troponins trended down at T4.  Echo in June'20 showed EF of 50%. No further syncope evaluation like carotid doppler and others could not be done due to covid status.  Pt tested positive COVID-19. Plan: patient with positive covid pcr and IgG negative. CXR showed no acute infiltrates, saturating well on RA, treatment with decadron and prophylaxis with vitamin C, zinc , pepcid and heparin. On admission, pt had likely in the setting of dehydration and COVID, Urinalysis is negative, Urine lytes are normal, creat trended down back to baseline  Nephro Dr Rubio followed up. The Pt had multiple tachycardic episodes which was controlled with metoprolol. 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 Cardiology Dr Miguel consulted.    Patient is stable for discharge per attending and is advised to follow up with PCP as outpatient  Please refer to patient's complete medical chart with documents for a full hospital course, for this is only a brief summary.     51yo M from home lives with family walks independently with PMH pre-DM , HLD , HTN, cardiac attack and stroke (residual left hemianopsia), OA of b/l Knees presented with and episode of witnessed syncope in the morning . pt reports food poisoning associated with multiple watery non bloody diarrhea , poor oral intake for 3 days. pt had his Bp medication with small amount of water during past 3 days. pt  went to the bathroom this AM and had diarrhea, when he stood up he felt dizzy and passed out. LOC for seconds , denies chest pain , palpitation , sob , cold sweats , chills, Nausea , vomiting any focal weakness or numbness. Pt had a history of cardiac attack and stroke ( with residual left peripheral vision loss)  4 years ago, had been on blood thinners for 3 months after cardiac attack ,  follows up with cardiologist regularly , last echo in June 2020 showed cardiac function within normal limits. patient non smoker, denies pedal edema , ARAGON , orthopnea , PND, can walk >10 blocks and goes more than 4 flights of stairs. never had stress test in the past.    For the diarrhea, pt has had no diarrheal episodes since Thursday (12/3), s/p cipro and flagyl. GI Dr. De La Cruz followed. Troponins trended down at T4.  Echo in June'20 showed EF of 50%. No further syncope evaluation like carotid doppler and others could not be done due to covid status.  Pt tested positive covid pcr and negative IgG. CXR showed no acute infiltrates, saturating well on RA, treatment with decadron and prophylaxis with vitamin C, zinc , pepcid and heparin. On admission, pt had likely in the setting of dehydration and COVID, Urinalysis is negative, Urine lytes are normal, creat trended down back to baseline  Nephro Dr Rubio followed up. The Pt had multiple tachycardic episodes which was controlled with metoprolol. 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 Cardiology Dr Miguel consulted.    Patient is stable for discharge per attending and is advised to follow up with PCP as outpatient  Please refer to patient's complete medical chart with documents for a full hospital course, for this is only a brief summary.     51yo M from home lives with family walks independently with PMH pre-DM , HLD , HTN, cardiac attack and stroke (residual left hemianopsia), OA of b/l Knees presented with and episode of witnessed syncope in the morning . pt reports food poisoning associated with multiple watery non bloody diarrhea , poor oral intake for 3 days. pt had his Bp medication with small amount of water during past 3 days. pt  went to the bathroom this AM and had diarrhea, when he stood up he felt dizzy and passed out. LOC for seconds , denies chest pain , palpitation , sob , cold sweats , chills, Nausea , vomiting any focal weakness or numbness. Pt had a history of cardiac attack and stroke ( with residual left peripheral vision loss)  4 years ago, had been on blood thinners for 3 months after cardiac attack ,  follows up with cardiologist regularly , last echo in June 2020 showed cardiac function within normal limits. patient non smoker, denies pedal edema , ARAGON , orthopnea , PND, can walk >10 blocks and goes more than 4 flights of stairs. never had stress test in the past.    For the diarrhea, pt has had no diarrheal episodes since Thursday (12/3), s/p cipro and flagyl. GI Dr. De La Cruz followed. Elevated troponins likely 2nd to demand ischemia 2nd to sepsis on admission 2nd to covid infection trended down at T4   Echo in June'20 showed EF of 50%. No further syncope evaluation like carotid doppler and others could not be done due to covid status.  Pt tested positive covid pcr and negative IgG. CXR showed no acute infiltrates, saturating well on RA, treatment with decadron and prophylaxis with vitamin C, zinc , pepcid and heparin. On admission, pt had likely in the setting of dehydration and COVID, Urinalysis is negative, Urine lytes are normal, creat trended down back to baseline  Nephro Dr Rubio followed up. The Pt had multiple tachycardic episodes which was controlled with metoprolol. 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 Cardiology Dr Miguel consulted.    Patient is stable for discharge per attending and is advised to follow up with PCP as outpatient  Please refer to patient's complete medical chart with documents for a full hospital course, for this is only a brief summary.

## 2020-12-05 NOTE — DISCHARGE NOTE NURSING/CASE MANAGEMENT/SOCIAL WORK - PATIENT PORTAL LINK FT
You can access the FollowMyHealth Patient Portal offered by [facility] by registering at the following website: [email or web address]. By joining [facility]’s FollowMyHealth portal, you will also be able to view your health information using other applications (apps) compatible with our system.

## 2020-12-05 NOTE — PROGRESS NOTE ADULT - NEGATIVE OPHTHALMOLOGIC SYMPTOMS
no lacrimation L/no lacrimation R/no pain R/no irritation L/no irritation R/no scleral injection L/no diplopia/no blurred vision L/no discharge L/no blurred vision R/no scleral injection R/no photophobia/no discharge R/no pain L

## 2020-12-05 NOTE — PROGRESS NOTE ADULT - SUBJECTIVE AND OBJECTIVE BOX
[   ] ICU                                          [   ] CCU                                      [  X ] Medical Floor      Patient is comfortable. No new complaints reported, No abdominal pain, N/V, hematemesis, hematochezia, melena, fever, chills, chest pain, SOB, cough or diarrhea reported.    VITALS  Vital Signs Last 24 Hrs  T(C): 37.2 (05 Dec 2020 07:57), Max: 37.3 (04 Dec 2020 15:21)  T(F): 98.9 (05 Dec 2020 07:57), Max: 99.2 (04 Dec 2020 15:21)  HR: 86 (05 Dec 2020 07:57) (86 - 112)  BP: 125/86 (05 Dec 2020 07:57) (119/89 - 143/85)   RR: 24 (05 Dec 2020 07:57) (17 - 24)  SpO2: 95% (05 Dec 2020 07:57) (95% - 95%)       MEDICATIONS  (STANDING):  ascorbic acid 1000 milliGRAM(s) Oral daily  aspirin enteric coated 81 milliGRAM(s) Oral daily  atorvastatin 80 milliGRAM(s) Oral at bedtime  cholecalciferol 5000 Unit(s) Oral daily  dexAMETHasone  Injectable 6 milliGRAM(s) IV Push daily  famotidine    Tablet 40 milliGRAM(s) Oral two times a day  glucagon  Injectable 1 milliGRAM(s) IntraMuscular once  heparin   Injectable 5000 Unit(s) SubCutaneous every 8 hours  insulin lispro (ADMELOG) corrective regimen sliding scale   SubCutaneous Before meals and at bedtime  metoprolol tartrate 50 milliGRAM(s) Oral two times a day  montelukast 10 milliGRAM(s) Oral at bedtime  zinc sulfate 220 milliGRAM(s) Oral daily    MEDICATIONS  (PRN):  acetaminophen   Tablet .. 650 milliGRAM(s) Oral every 6 hours PRN Temp greater or equal to 38C (100.4F), Mild Pain (1 - 3)                            11.7   8.05  )-----------( 277      ( 05 Dec 2020 07:49 )             35.9       12-05    135  |  101  |  26<H>  ----------------------------<  155<H>  4.4   |  24  |  1.41<H>    Ca    8.8      05 Dec 2020 07:49  Phos  3.5     12-05  Mg     2.1     12-05    TPro  8.0  /  Alb  3.0<L>  /  TBili  0.6  /  DBili  x   /  AST  60<H>  /  ALT  46  /  AlkPhos  58  12-04

## 2020-12-05 NOTE — PROGRESS NOTE ADULT - ASSESSMENT
51 yo M from home lives with family walks independently with PMH CAD-s/p MI,DM , HLD , HTN,  stroke (residual left hemianopsia), OA of b/l Knees presented with and episode of witnessed syncope ,LUCI,diarrhea,COVID+,Sinus tachycardia.  1.F/U as outpatient with own cardiologist.  2.LUCI resolving.  3.Diarrhea-abx,stool studies.  4.CAD-asa, b blocker to 50mg bid,statin.  5.DM-Insulin.  6.COVID+-no treatment CXR-.  7.GI f/u noted.  8.GI and DVT prophylaxis.

## 2020-12-05 NOTE — PROGRESS NOTE ADULT - SUBJECTIVE AND OBJECTIVE BOX
Patient is a 50y old  Male who presents with a chief complaint of Syncope (04 Dec 2020 18:06)    pt seen in tele [ x ], reg med floor [   ], bed [x  ], chair at bedside [   ], a+o x3 [x  ], lethargic [  ],    nad [ x ]        Allergies    No Known Allergies        Vitals    T(F): 99.2 (12-05-20 @ 04:28), Max: 99.2 (12-04-20 @ 15:21)  HR: 98 (12-05-20 @ 04:28) (95 - 112)  BP: 131/90 (12-05-20 @ 04:28) (119/89 - 143/85)  RR: 18 (12-05-20 @ 04:28) (17 - 18)  SpO2: 95% (12-05-20 @ 04:28) (95% - 96%)  Wt(kg): --  CAPILLARY BLOOD GLUCOSE      POCT Blood Glucose.: 206 mg/dL (04 Dec 2020 20:58)      Labs                          11.8   4.55  )-----------( 219      ( 04 Dec 2020 07:49 )             35.8       12-04    135  |  100  |  20<H>  ----------------------------<  134<H>  4.2   |  24  |  1.34<H>    Ca    8.7      04 Dec 2020 07:49  Phos  2.0     12-04  Mg     2.0     12-04    TPro  8.0  /  Alb  3.0<L>  /  TBili  0.6  /  DBili  x   /  AST  60<H>  /  ALT  46  /  AlkPhos  58  12-04                Radiology Results      Meds    MEDICATIONS  (STANDING):  ascorbic acid 1000 milliGRAM(s) Oral daily  aspirin enteric coated 81 milliGRAM(s) Oral daily  atorvastatin 80 milliGRAM(s) Oral at bedtime  cholecalciferol 5000 Unit(s) Oral daily  dexAMETHasone  Injectable 6 milliGRAM(s) IV Push daily  famotidine    Tablet 40 milliGRAM(s) Oral two times a day  glucagon  Injectable 1 milliGRAM(s) IntraMuscular once  heparin   Injectable 5000 Unit(s) SubCutaneous every 8 hours  insulin lispro (ADMELOG) corrective regimen sliding scale   SubCutaneous Before meals and at bedtime  metoprolol tartrate 50 milliGRAM(s) Oral two times a day  montelukast 10 milliGRAM(s) Oral at bedtime  zinc sulfate 220 milliGRAM(s) Oral daily      MEDICATIONS  (PRN):  acetaminophen   Tablet .. 650 milliGRAM(s) Oral every 6 hours PRN Temp greater or equal to 38C (100.4F), Mild Pain (1 - 3)      Physical Exam    Neuro :  no focal deficits  Respiratory: CTA B/L  CV: RRR, S1S2, no murmurs,   Abdominal: Soft, NT, ND +BS,  Extremities: No edema, + peripheral pulses    ASSESSMENT    Syncope and collapse likely vasovagal,   r/o acs,   r/o arrythmia    gastroenteritis possibly 2nd to food poisoning,   dehydration,   covid 19 infection   sepsis  h/o  pre-DM ,   HLD ,   HTN,   mi   stroke (residual left hemianopsia),   OA of b/l Knees          PLAN      cont tele,   cont aspirin, statin,   ce q8 x 4 noted  cardio f/u   cont lopressor 25 mg bid  obtain echo from outpt provider  gi f/u.   ivf   f/u stool pcr and cx   diarrhea resolved   cipro, flagyl d/c'd,   tmx 99  pt covid positive with negative antibody  cont vit c, vit d, pepcid, singulair, zinc  O2 sat (93% - 97%) on room air   cont decadron   O2 via nasal canula if needed  pulm f/u  contact and airborne isolation  cont albuterol inhaler   F/u D-dimer, esr, crp, ldh, ferritin, lactate    cont tylenol prn,   cont robitussin prn   cont current meds         Patient is a 50y old  Male who presents with a chief complaint of Syncope (04 Dec 2020 18:06)    pt seen in tele [ x ], reg med floor [   ], bed [x  ], chair at bedside [   ], a+o x3 [x  ], lethargic [  ],    nad [ x ]        Allergies    No Known Allergies        Vitals    T(F): 99.2 (12-05-20 @ 04:28), Max: 99.2 (12-04-20 @ 15:21)  HR: 98 (12-05-20 @ 04:28) (95 - 112)  BP: 131/90 (12-05-20 @ 04:28) (119/89 - 143/85)  RR: 18 (12-05-20 @ 04:28) (17 - 18)  SpO2: 95% (12-05-20 @ 04:28) (95% - 96%)  Wt(kg): --  CAPILLARY BLOOD GLUCOSE      POCT Blood Glucose.: 206 mg/dL (04 Dec 2020 20:58)      Labs                          11.8   4.55  )-----------( 219      ( 04 Dec 2020 07:49 )             35.8       12-04    135  |  100  |  20<H>  ----------------------------<  134<H>  4.2   |  24  |  1.34<H>    Ca    8.7      04 Dec 2020 07:49  Phos  2.0     12-04  Mg     2.0     12-04    TPro  8.0  /  Alb  3.0<L>  /  TBili  0.6  /  DBili  x   /  AST  60<H>  /  ALT  46  /  AlkPhos  58  12-04        COVID-19 Antibody - inpatient disease monitoring (12.02.20 @ 09:29)   COVID-19 IgA Antibody Index: 3.8: [employer] VIMAL   Negative Result:   Equivocal Result: 0.80 - 1.09 Ratio   Positive Result: >/= 1.10 Ratio Ratio   COVID-19 IgA Antibody Interpretation: Positive: This test has not been reviewed by the FDA by the standard review         Radiology Results      Meds    MEDICATIONS  (STANDING):  ascorbic acid 1000 milliGRAM(s) Oral daily  aspirin enteric coated 81 milliGRAM(s) Oral daily  atorvastatin 80 milliGRAM(s) Oral at bedtime  cholecalciferol 5000 Unit(s) Oral daily  dexAMETHasone  Injectable 6 milliGRAM(s) IV Push daily  famotidine    Tablet 40 milliGRAM(s) Oral two times a day  glucagon  Injectable 1 milliGRAM(s) IntraMuscular once  heparin   Injectable 5000 Unit(s) SubCutaneous every 8 hours  insulin lispro (ADMELOG) corrective regimen sliding scale   SubCutaneous Before meals and at bedtime  metoprolol tartrate 50 milliGRAM(s) Oral two times a day  montelukast 10 milliGRAM(s) Oral at bedtime  zinc sulfate 220 milliGRAM(s) Oral daily      MEDICATIONS  (PRN):  acetaminophen   Tablet .. 650 milliGRAM(s) Oral every 6 hours PRN Temp greater or equal to 38C (100.4F), Mild Pain (1 - 3)      Physical Exam    Neuro :  no focal deficits  Respiratory: CTA B/L  CV: RRR, S1S2, no murmurs,   Abdominal: Soft, NT, ND +BS,  Extremities: No edema, + peripheral pulses    ASSESSMENT    Syncope and collapse likely vasovagal,   r/o acs,   r/o arrythmia    gastroenteritis possibly 2nd to food poisoning,   dehydration,   covid 19 infection   sepsis  h/o  pre-DM ,   HLD ,   HTN,   mi   stroke (residual left hemianopsia),   OA of b/l Knees          PLAN      cont tele,   cont aspirin, statin,   ce q8 x 4 noted  cardio f/u   cont lopressor 50 mg bid  obtain echo from outpt provider  gi f/u.   f/u stool pcr and cx   diarrhea resolved   cipro, flagyl d/c'd,   tmx 99  pt covid positive with positive antibody noted above  cont vit c, vit d, pepcid, singulair, zinc x 14 days  O2 sat (95% - 96%) on room air   cont decadron to complete 5 days  pulm f/u  contact and airborne isolation  cont albuterol inhaler   cont tylenol prn,   cont robitussin prn   cont current meds  pt stable for d/c

## 2020-12-05 NOTE — PROGRESS NOTE ADULT - NEGATIVE ENMT SYMPTOMS
no vertigo/no nasal discharge/no tinnitus/no sinus symptoms/no post-nasal discharge/no gum bleeding/no dry mouth/no nasal obstruction/no hearing difficulty/no ear pain/no nasal congestion/no nose bleeds/no throat pain/no dysphagia

## 2020-12-05 NOTE — PROGRESS NOTE ADULT - SUBJECTIVE AND OBJECTIVE BOX
Patient is a 50y old  Male who presents with a chief complaint of Syncope (05 Dec 2020 10:46)  awake, alert, comfortable in bed in NAD. Feeling good. No cough or sob    INTERVAL HPI/OVERNIGHT EVENTS:      VITAL SIGNS:  T(F): 98.9 (12-05-20 @ 07:57)  HR: 86 (12-05-20 @ 07:57)  BP: 125/86 (12-05-20 @ 07:57)  RR: 24 (12-05-20 @ 07:57)  SpO2: 95% (12-05-20 @ 07:57)  Wt(kg): --  I&O's Detail          REVIEW OF SYSTEMS:    CONSTITUTIONAL:  No fevers, chills, sweats    HEENT:  Eyes:  No diplopia or blurred vision. ENT:  No earache, sore throat or runny nose.    CARDIOVASCULAR:  No pressure, squeezing, tightness, or heaviness about the chest; no palpitations.    RESPIRATORY:  Per HPI    GASTROINTESTINAL:  No abdominal pain, nausea, vomiting or diarrhea.    GENITOURINARY:  No dysuria, frequency or urgency.    NEUROLOGIC:  No paresthesias, fasciculations, seizures or weakness.    PSYCHIATRIC:  No disorder of thought or mood.      PHYSICAL EXAM:    Constitutional: Well developed and nourished  Eyes:Perrla  ENMT: normal  Neck:supple  Respiratory: good air entry  Cardiovascular: S1 S2 regular  Gastrointestinal: Soft, Non tender  Extremities: No edema  Vascular:normal  Neurological:Awake, alert,Ox3  Musculoskeletal:Normal      MEDICATIONS  (STANDING):  ascorbic acid 1000 milliGRAM(s) Oral daily  aspirin enteric coated 81 milliGRAM(s) Oral daily  atorvastatin 80 milliGRAM(s) Oral at bedtime  cholecalciferol 5000 Unit(s) Oral daily  dexAMETHasone  Injectable 6 milliGRAM(s) IV Push daily  famotidine    Tablet 40 milliGRAM(s) Oral two times a day  glucagon  Injectable 1 milliGRAM(s) IntraMuscular once  heparin   Injectable 5000 Unit(s) SubCutaneous every 8 hours  insulin lispro (ADMELOG) corrective regimen sliding scale   SubCutaneous Before meals and at bedtime  metoprolol tartrate 50 milliGRAM(s) Oral two times a day  montelukast 10 milliGRAM(s) Oral at bedtime  zinc sulfate 220 milliGRAM(s) Oral daily    MEDICATIONS  (PRN):  acetaminophen   Tablet .. 650 milliGRAM(s) Oral every 6 hours PRN Temp greater or equal to 38C (100.4F), Mild Pain (1 - 3)      Allergies    No Known Allergies    Intolerances        LABS:                        11.7   8.05  )-----------( 277      ( 05 Dec 2020 07:49 )             35.9     12-05    135  |  101  |  26<H>  ----------------------------<  155<H>  4.4   |  24  |  1.41<H>    Ca    8.8      05 Dec 2020 07:49  Phos  3.5     12-05  Mg     2.1     12-05    TPro  8.0  /  Alb  3.0<L>  /  TBili  0.6  /  DBili  x   /  AST  60<H>  /  ALT  46  /  AlkPhos  58  12-04              CAPILLARY BLOOD GLUCOSE      POCT Blood Glucose.: 206 mg/dL (04 Dec 2020 20:58)  POCT Blood Glucose.: 181 mg/dL (04 Dec 2020 16:48)    pro-bnp -- 12-04 @ 10:43     d-dimer 356  12-04 @ 10:43  pro-bnp -- 12-01 @ 17:27     d-dimer 388  12-01 @ 17:27      RADIOLOGY & ADDITIONAL TESTS:    CXR:    Ct scan chest:    ekg;    echo:

## 2020-12-05 NOTE — DISCHARGE NOTE PROVIDER - NSDCCPCAREPLAN_GEN_ALL_CORE_FT
PRINCIPAL DISCHARGE DIAGNOSIS  Diagnosis: Syncope  Assessment and Plan of Treatment: You came to  s/p cipro and flagyl. GI Dr. De La Cruz followed. Troponins trended down at T4. Echocardiogram in June'20 showed ejection fraction of 50%. No further syncope evaluation like carotid doppler and others could not be done due to covid status. . On admission, pt had likely in the setting of dehydration and COVID, Urinalysis is negative, Urine lytes are normal, creat trended down back to baseline  Nephro Dr Rubio followed up. The Pt had multiple tachycardic episodes which was controlled with metoprolol. 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 Cardiology Dr Miguel consulted.      SECONDARY DISCHARGE DIAGNOSES  Diagnosis: CAD (coronary atherosclerotic disease)  Assessment and Plan of Treatment:     Diagnosis: LUCI (acute kidney injury)  Assessment and Plan of Treatment:     Diagnosis: COVID-19  Assessment and Plan of Treatment: You tested positive for active  COVID-19 infection where PCR was positive and IgG negative. Inflammatory markers were positive for active infection. Chest xray showed no acute infiltrates, You are breathing comfortably and saturating well on room air. You were treated with decadron and prophylaxis with vitamin C, zinc , pepcid and heparin.   CORONAVIRUS INSTRUCTIONS:  Based on your current clinical status and stability, it has been determined that you no longer need hospitalization and can recover while remaining in self-quarantine at home. You should follow the prevention steps below until a healthcare provider or local or state health department says you can return to your normal activities.   1. You should restrict activities outside your home, except for getting medical care.   2. Do not go to work, school, or public areas.   3. Avoid using public transportation, ride-sharing, or taxis.   4. Separate yourself from other people and animals in your home as much as possible.  When you are around other people (e.g., sharing a room or vehicle) you should wear a facemask.  5. Wash your hands often with soap and water for at least 20 seconds, especially after blowing your nose, coughing, or sneezing; going to the bathroom; and before eating or preparing food.  6. Cover your mouth and nose with a tissue when you cough or sneeze. Throw used tissues in a lined trash can. Immediately wash your hands with soap and water for at least 20 seconds  7. High touch surfaces include counters, tabletops, doorknobs, bathroom fixtures, toilets, phones, keyboards, tablets, and bedside tables.  8. Avoid sharing dishes, drinking glasses, cups, eating utensils, towels, or bedding with other people or pets in your home. After using these items, they should be washed thoroughly with soap and water.  You are strongly advised to seek prompt medical attention if your illness worsens or you develop new symptoms like fever or difficulty breathing.   Please call  [phone] to speak with your discharging    Diagnosis: Diarrhea, unspecified type  Assessment and Plan of Treatment:      PRINCIPAL DISCHARGE DIAGNOSIS  Diagnosis: Syncope  Assessment and Plan of Treatment: Troponins trended down at T4. Echocardiogram in June'20 showed ejection fraction of 50%. No further syncope evaluation like carotid doppler and others could not be done due to your covid status. You will need to follow up with your primary care physician after your quarantine period of two weeks.      SECONDARY DISCHARGE DIAGNOSES  Diagnosis: Diarrhea  Assessment and Plan of Treatment: You came to [facility] with diarrhea which resolved on the second day of your hospital stay. It coud have been due to covid or a food poisoning. You were treated with ciprofloxacin and flagyl antibiotics. Gastroenterology Dr. De La Cruz followed.    Diagnosis: COVID-19  Assessment and Plan of Treatment: You tested positive for active  COVID-19 infection where PCR was positive and IgG negative. Inflammatory markers were positive for active infection. Chest xray showed no acute infiltrates, You are breathing comfortably and saturating well on room air. You were treated with decadron and prophylaxis with vitamin C, zinc , pepcid and heparin.   CORONAVIRUS INSTRUCTIONS:  Based on your current clinical status and stability, it has been determined that you no longer need hospitalization and can recover while remaining in self-quarantine at home. You should follow the prevention steps below until a healthcare provider or local or state health department says you can return to your normal activities.   1. You should restrict activities outside your home, except for getting medical care.   2. Do not go to work, school, or public areas.   3. Avoid using public transportation, ride-sharing, or taxis.   4. Separate yourself from other people and animals in your home as much as possible.  When you are around other people (e.g., sharing a room or vehicle) you should wear a facemask.  5. Wash your hands often with soap and water for at least 20 seconds, especially after blowing your nose, coughing, or sneezing; going to the bathroom; and before eating or preparing food.  6. Cover your mouth and nose with a tissue when you cough or sneeze. Throw used tissues in a lined trash can. Immediately wash your hands with soap and water for at least 20 seconds  7. High touch surfaces include counters, tabletops, doorknobs, bathroom fixtures, toilets, phones, keyboards, tablets, and bedside tables.  8. Avoid sharing dishes, drinking glasses, cups, eating utensils, towels, or bedding with other people or pets in your home. After using these items, they should be washed thoroughly with soap and water.  You are strongly advised to seek prompt medical attention if your illness worsens or you develop new symptoms like fever or difficulty breathing.   Please call   to speak with your discharging    Diagnosis: Sinus tachycardia  Assessment and Plan of Treatment: You had multiple episodes where your heart was at a higher rate. You were monitored on the telemetry monitor. Your heart rate was controlled with metoprolol which you will need to continue at home.    Diagnosis: LUCI (acute kidney injury)  Assessment and Plan of Treatment: On admission, you had increased increatine levels, indicating a kidney injury. Urinalysis is negative and Urine lytes are normal. You were treated with intravenous fluids. Your creatinine trended down back to baseline. This most likely is in the setting of dehydration and COVID. Nephrologist Dr Rubio followed up.    Diagnosis: CAD (coronary atherosclerotic disease)  Assessment and Plan of Treatment: You have history of heart attacks and stroke. You were, treated with aspirin, atorvastatin and metoprolol. Cardiology Dr Miguel consulted.     PRINCIPAL DISCHARGE DIAGNOSIS  Diagnosis: Syncope  Assessment and Plan of Treatment: Troponins trended down at T4. Echocardiogram in June'20 showed ejection fraction of 50%. No further syncope evaluation like carotid doppler and others could not be done due to your covid status. You will need to follow up with your primary care physician after your quarantine period of two weeks.      SECONDARY DISCHARGE DIAGNOSES  Diagnosis: COVID-19  Assessment and Plan of Treatment: You tested positive for active  COVID-19 infection where PCR was positive and IgG negative. Inflammatory markers were positive for active infection. Chest xray showed no acute infiltrates, You are breathing comfortably and saturating well on room air. You were treated with decadron and prophylaxis with vitamin C, zinc , pepcid and heparin.   Continue taking the decadron for 2 more days , continue taking zinc,   CORONAVIRUS INSTRUCTIONS:  Based on your current clinical status and stability, it has been determined that you no longer need hospitalization and can recover while remaining in self-quarantine at home. You should follow the prevention steps below until a healthcare provider or local or state health department says you can return to your normal activities.   1. You should restrict activities outside your home, except for getting medical care.   2. Do not go to work, school, or public areas.   3. Avoid using public transportation, ride-sharing, or taxis.   4. Separate yourself from other people and animals in your home as much as possible.  When you are around other people (e.g., sharing a room or vehicle) you should wear a facemask.  5. Wash your hands often with soap and water for at least 20 seconds, especially after blowing your nose, coughing, or sneezing; going to the bathroom; and before eating or preparing food.  6. Cover your mouth and nose with a tissue when you cough or sneeze. Throw used tissues in a lined trash can. Immediately wash your hands with soap and water for at least 20 seconds  7. High touch surfaces include counters, tabletops, doorknobs, bathroom fixtures, toilets, phones, keyboards, tablets, and bedside tables.  8. Avoid sharing dishes, drinking glasses, cups, eating utensils, towels, or bedding with other people or pets in your home. After using these items, they should be washed thoroughly with soap and water.  You are strongly advised to seek prompt medical attention if your illness worsens or you develop new symptoms like fever or difficult    Diagnosis: LUCI (acute kidney injury)  Assessment and Plan of Treatment: On admission, you had increased increatine levels, indicating a kidney injury. Urinalysis is negative and Urine lytes are normal. You were treated with intravenous fluids. Your creatinine trended down back to baseline. This most likely is in the setting of dehydration and COVID.   Your Lisinopril-hydrochlorthiazide were held due to worsening creatinine.  Follow up with your Primary doctor in 1 week for repeat BMP and restart Lisinopril if your Creatinine continues to be stable    Diagnosis: CAD (coronary atherosclerotic disease)  Assessment and Plan of Treatment: You have history of heart attacks and stroke. You were, treated with aspirin, atorvastatin and metoprolol. Cardiology Dr Miguel consulted.  you will be discharged on Metoprolol 50 mg twice a day . Your amlodipine was stopped as your blood pressure has been controlled on Metoprolol .    Diagnosis: DM (diabetes mellitus)  Assessment and Plan of Treatment: Your HbA1c level is 7.0   Continue with metformin . Monitor your kidney function while on metformin   You are advised to eat low carbohydrate diet   Exercise/Walk  5 days/week for atleast 30 minutes as tolerated   Observe your feet for any ulcer, open wound or cut daily   You need eye exam once a year with your eye doctor   Follow up with your Primary doctor in 1-2 weeks    Diagnosis: Diarrhea  Assessment and Plan of Treatment: You came to  hospital with diarrhea which resolved on the second day of your hospital stay. It coud have been due to covid or a food poisoning. You were treated with ciprofloxacin and flagyl antibiotics. Gastroenterology Dr. De La Cruz followed.    Diagnosis: Sinus tachycardia  Assessment and Plan of Treatment: You had multiple episodes where your heart was at a higher rate. You were monitored on the telemetry monitor. Your heart rate was controlled with metoprolol which you will need to continue at home.     PRINCIPAL DISCHARGE DIAGNOSIS  Diagnosis: Syncope  Assessment and Plan of Treatment: Troponins trended down at T4. Echocardiogram in June'20 showed ejection fraction of 50%. No further syncope evaluation like carotid doppler and others could not be done due to your covid status. You will need to follow up with your Cardiologist for repeat Echocardiogram in 2 weeks      SECONDARY DISCHARGE DIAGNOSES  Diagnosis: COVID-19  Assessment and Plan of Treatment: You tested positive for active  COVID-19 infection where PCR was positive and IgG negative. Inflammatory markers were positive for active infection. Chest xray showed no acute infiltrates, You are breathing comfortably and saturating well on room air. You were treated with decadron and prophylaxis with vitamin C, zinc , pepcid and heparin.   Continue taking the decadron for 2 more days , continue taking zinc,   CORONAVIRUS INSTRUCTIONS:  Based on your current clinical status and stability, it has been determined that you no longer need hospitalization and can recover while remaining in self-quarantine at home. You should follow the prevention steps below until a healthcare provider or local or state health department says you can return to your normal activities.   1. You should restrict activities outside your home, except for getting medical care.   2. Do not go to work, school, or public areas.   3. Avoid using public transportation, ride-sharing, or taxis.   4. Separate yourself from other people and animals in your home as much as possible.  When you are around other people (e.g., sharing a room or vehicle) you should wear a facemask.  5. Wash your hands often with soap and water for at least 20 seconds, especially after blowing your nose, coughing, or sneezing; going to the bathroom; and before eating or preparing food.  6. Cover your mouth and nose with a tissue when you cough or sneeze. Throw used tissues in a lined trash can. Immediately wash your hands with soap and water for at least 20 seconds  7. High touch surfaces include counters, tabletops, doorknobs, bathroom fixtures, toilets, phones, keyboards, tablets, and bedside tables.  8. Avoid sharing dishes, drinking glasses, cups, eating utensils, towels, or bedding with other people or pets in your home. After using these items, they should be washed thoroughly with soap and water.  You are strongly advised to seek prompt medical attention if your illness worsens or you develop new symptoms like fever or difficult    Diagnosis: LUCI (acute kidney injury)  Assessment and Plan of Treatment: On admission, you had increased increatine levels, indicating a kidney injury. Urinalysis is negative and Urine lytes are normal. You were treated with intravenous fluids. Your creatinine trended down back to baseline. This most likely is in the setting of dehydration and COVID.   Your Lisinopril-hydrochlorthiazide were held due to worsening creatinine.  Follow up with your Primary doctor in 1 week for repeat BMP and restart Lisinopril if your Creatinine continues to be stable    Diagnosis: CAD (coronary atherosclerotic disease)  Assessment and Plan of Treatment: You have history of heart attacks and stroke. You were, treated with aspirin, atorvastatin and metoprolol. Cardiology Dr Miguel consulted.  you will be discharged on Metoprolol 50 mg twice a day . Your amlodipine was stopped as your blood pressure has been controlled on Metoprolol .    Diagnosis: DM (diabetes mellitus)  Assessment and Plan of Treatment: Your HbA1c level is 7.0   Continue with metformin . Monitor your kidney function while on metformin   You are advised to eat low carbohydrate diet   Exercise/Walk  5 days/week for atleast 30 minutes as tolerated   Observe your feet for any ulcer, open wound or cut daily   You need eye exam once a year with your eye doctor   Follow up with your Primary doctor in 1-2 weeks    Diagnosis: Diarrhea  Assessment and Plan of Treatment: You came to  hospital with diarrhea which resolved on the second day of your hospital stay. It coud have been due to covid or a food poisoning. You were treated with ciprofloxacin and flagyl antibiotics. Gastroenterology Dr. De La Cruz followed.    Diagnosis: Sinus tachycardia  Assessment and Plan of Treatment: You had multiple episodes where your heart was at a higher rate. You were monitored on the telemetry monitor. Your heart rate was controlled with metoprolol which you will need to continue at home.

## 2020-12-05 NOTE — PROGRESS NOTE ADULT - SUBJECTIVE AND OBJECTIVE BOX
CHIEF COMPLAINT:Patient is a 50y old  Male who presents with a chief complaint of Syncope.Pt appears comfortable.    	  REVIEW OF SYSTEMS:  CONSTITUTIONAL: No fever, weight loss, or fatigue  EYES: No eye pain, visual disturbances, or discharge  ENT:  No difficulty hearing, tinnitus, vertigo; No sinus or throat pain  NECK: No pain or stiffness  RESPIRATORY: No cough, wheezing, chills or hemoptysis; No Shortness of Breath  CARDIOVASCULAR: No chest pain, palpitations, passing out, dizziness, or leg swelling  GASTROINTESTINAL: No abdominal or epigastric pain. No nausea, vomiting, or hematemesis; No diarrhea or constipation. No melena or hematochezia.  GENITOURINARY: No dysuria, frequency, hematuria, or incontinence  NEUROLOGICAL: No headaches, memory loss, loss of strength, numbness, or tremors  SKIN: No itching, burning, rashes, or lesions   LYMPH Nodes: No enlarged glands  ENDOCRINE: No heat or cold intolerance; No hair loss  MUSCULOSKELETAL: No joint pain or swelling; No muscle, back, or extremity pain  PSYCHIATRIC: No depression, anxiety, mood swings, or difficulty sleeping  HEME/LYMPH: No easy bruising, or bleeding gums  ALLERGY AND IMMUNOLOGIC: No hives or eczema	      PHYSICAL EXAM:  T(C): 37.2 (12-05-20 @ 07:57), Max: 37.3 (12-04-20 @ 15:21)  HR: 86 (12-05-20 @ 07:57) (86 - 112)  BP: 125/86 (12-05-20 @ 07:57) (119/89 - 143/85)  RR: 24 (12-05-20 @ 07:57) (17 - 24)  SpO2: 95% (12-05-20 @ 07:57) (95% - 95%)  Wt(kg): --  I&O's Summary      Appearance: Normal	  HEENT:   Normal oral mucosa, PERRL, EOMI	  Lymphatic: No lymphadenopathy  Cardiovascular: Normal S1 S2, No JVD, No murmurs, No edema  Respiratory: Lungs clear to auscultation	  Psychiatry: A & O x 3, Mood & affect appropriate  Gastrointestinal:  Soft, Non-tender, + BS	  Skin: No rashes, No ecchymoses, No cyanosis	  Neurologic: Non-focal  Extremities: Normal range of motion, No clubbing, cyanosis or edema  Vascular: Peripheral pulses palpable 2+ bilaterally    MEDICATIONS  (STANDING):  ascorbic acid 1000 milliGRAM(s) Oral daily  aspirin enteric coated 81 milliGRAM(s) Oral daily  atorvastatin 80 milliGRAM(s) Oral at bedtime  cholecalciferol 5000 Unit(s) Oral daily  dexAMETHasone  Injectable 6 milliGRAM(s) IV Push daily  famotidine    Tablet 40 milliGRAM(s) Oral two times a day  glucagon  Injectable 1 milliGRAM(s) IntraMuscular once  heparin   Injectable 5000 Unit(s) SubCutaneous every 8 hours  insulin lispro (ADMELOG) corrective regimen sliding scale   SubCutaneous Before meals and at bedtime  metoprolol tartrate 50 milliGRAM(s) Oral two times a day  montelukast 10 milliGRAM(s) Oral at bedtime  zinc sulfate 220 milliGRAM(s) Oral daily    	  	  LABS:	 	                         11.7   8.05  )-----------( 277      ( 05 Dec 2020 07:49 )             35.9     12-05    135  |  101  |  26<H>  ----------------------------<  155<H>  4.4   |  24  |  1.41<H>    Ca    8.8      05 Dec 2020 07:49  Phos  3.5     12-05  Mg     2.1     12-05    TPro  8.0  /  Alb  3.0<L>  /  TBili  0.6  /  DBili  x   /  AST  60<H>  /  ALT  46  /  AlkPhos  58  12-04      Lipid Profile: Cholesterol 93  LDL --  HDL 32        TSH: Thyroid Stimulating Hormone, Serum: 1.02 uU/mL (12-01 @ 12:16)

## 2020-12-05 NOTE — PROGRESS NOTE ADULT - RS GEN PE MLT RESP DETAILS PC
good air movement/breath sounds equal/no rhonchi/no rales/no wheezes/respirations non-labored/airway patent

## 2020-12-05 NOTE — PROGRESS NOTE ADULT - REASON FOR ADMISSION
Syncope

## 2020-12-05 NOTE — PROGRESS NOTE ADULT - NEGATIVE MUSCULOSKELETAL SYMPTOMS
no muscle weakness/no muscle cramps/no neck pain/no leg pain L/no stiffness/no arm pain L/no arm pain R/no leg pain R/no back pain

## 2020-12-06 ENCOUNTER — TRANSCRIPTION ENCOUNTER (OUTPATIENT)
Age: 50
End: 2020-12-06

## 2020-12-06 RX ORDER — DEXAMETHASONE 0.5 MG/5ML
1 ELIXIR ORAL
Qty: 2 | Refills: 0
Start: 2020-12-06 | End: 2020-12-07

## 2020-12-07 DIAGNOSIS — Z71.89 OTHER SPECIFIED COUNSELING: ICD-10-CM

## 2020-12-08 PROBLEM — I10 ESSENTIAL (PRIMARY) HYPERTENSION: Chronic | Status: ACTIVE | Noted: 2020-12-01

## 2020-12-10 LAB
GLUCOSE BLDC GLUCOMTR-MCNC: 128 MG/DL — HIGH (ref 70–99)
GLUCOSE BLDC GLUCOMTR-MCNC: 129 MG/DL — HIGH (ref 70–99)
GLUCOSE BLDC GLUCOMTR-MCNC: 139 MG/DL — HIGH (ref 70–99)

## 2021-03-19 ENCOUNTER — APPOINTMENT (OUTPATIENT)
Dept: RHEUMATOLOGY | Facility: CLINIC | Age: 51
End: 2021-03-19
Payer: MEDICAID

## 2021-03-19 VITALS
RESPIRATION RATE: 16 BRPM | WEIGHT: 191 LBS | HEIGHT: 65.5 IN | HEART RATE: 102 BPM | SYSTOLIC BLOOD PRESSURE: 135 MMHG | BODY MASS INDEX: 31.44 KG/M2 | OXYGEN SATURATION: 96 % | DIASTOLIC BLOOD PRESSURE: 88 MMHG | TEMPERATURE: 98.5 F

## 2021-03-19 DIAGNOSIS — I25.2 OLD MYOCARDIAL INFARCTION: ICD-10-CM

## 2021-03-19 DIAGNOSIS — M16.10 UNILATERAL PRIMARY OSTEOARTHRITIS, UNSPECIFIED HIP: ICD-10-CM

## 2021-03-19 DIAGNOSIS — Z86.73 PERSONAL HISTORY OF TRANSIENT ISCHEMIC ATTACK (TIA), AND CEREBRAL INFARCTION W/OUT RESIDUAL DEFICITS: ICD-10-CM

## 2021-03-19 DIAGNOSIS — I51.9 HEART DISEASE, UNSPECIFIED: ICD-10-CM

## 2021-03-19 DIAGNOSIS — M79.605 PAIN IN RIGHT LEG: ICD-10-CM

## 2021-03-19 DIAGNOSIS — M79.604 PAIN IN RIGHT LEG: ICD-10-CM

## 2021-03-19 PROCEDURE — 99072 ADDL SUPL MATRL&STAF TM PHE: CPT

## 2021-03-19 PROCEDURE — 99204 OFFICE O/P NEW MOD 45 MIN: CPT

## 2021-03-19 RX ORDER — AMLODIPINE BESYLATE 5 MG/1
5 TABLET ORAL
Qty: 30 | Refills: 3 | Status: ACTIVE | COMMUNITY
Start: 2021-03-19

## 2021-03-22 NOTE — PHYSICAL EXAM
[General Appearance - Alert] : alert [General Appearance - In No Acute Distress] : in no acute distress [Neck Appearance] : the appearance of the neck was normal [Neck Cervical Mass (___cm)] : no neck mass was observed [Jugular Venous Distention Increased] : there was no jugular-venous distention [Thyroid Diffuse Enlargement] : the thyroid was not enlarged [Thyroid Nodule] : there were no palpable thyroid nodules [Auscultation Breath Sounds / Voice Sounds] : lungs were clear to auscultation bilaterally [Heart Rate And Rhythm] : heart rate was normal and rhythm regular [Heart Sounds] : normal S1 and S2 [Heart Sounds Gallop] : no gallops [Murmurs] : no murmurs [Heart Sounds Pericardial Friction Rub] : no pericardial rub [Full Pulse] : the pedal pulses are present [Edema] : there was no peripheral edema [Bowel Sounds] : normal bowel sounds [Abdomen Soft] : soft [Abdomen Tenderness] : non-tender [Abdomen Mass (___ Cm)] : no abdominal mass palpated [Cervical Lymph Nodes Enlarged Posterior Bilaterally] : posterior cervical [Cervical Lymph Nodes Enlarged Anterior Bilaterally] : anterior cervical [Supraclavicular Lymph Nodes Enlarged Bilaterally] : supraclavicular [No CVA Tenderness] : no ~M costovertebral angle tenderness [No Spinal Tenderness] : no spinal tenderness [Abnormal Walk] : normal gait [Nail Clubbing] : no clubbing  or cyanosis of the fingernails [Musculoskeletal - Swelling] : no joint swelling seen [Motor Tone] : muscle strength and tone were normal [FreeTextEntry1] : bilateral hip pain on movement - mild [Skin Color & Pigmentation] : normal skin color and pigmentation [Skin Turgor] : normal skin turgor [] : no rash [Oriented To Time, Place, And Person] : oriented to person, place, and time [Impaired Insight] : insight and judgment were intact [Affect] : the affect was normal

## 2021-03-22 NOTE — ASSESSMENT
[FreeTextEntry1] : \par 50 year-old male with PMH of CAD with 1 MI and stroke, now with severe bilateral thigh /hip pain after covid 19 \par No pain are rest\par \par D.Dx : hip OA, Arterial insufficiency\par \par Send for x-rays of bilateral hip\par US doppler of lower extremities arteries\par \par Given hx of MI and stroke at a early age will send for Apl and clotting factors\par add basic rheum work-up\par \par \par f/u after labs and imaging studies are completed

## 2021-03-22 NOTE — HISTORY OF PRESENT ILLNESS
[FreeTextEntry1] : patient with leg pain that were intermittent - during these episodes he would be severe impaired with severe pain and difficulty moving - Seen by ortho - given Pt with no improvement\par patient had covid in December  admitted to [facility] for 5 days - no intubation required\par \par and since then he has  leg pain after walking a few feet and has to stop and rest prior to be able to continue to walk - pain is severe and is disrupting his ability to ambulate  - NOt takng any medication - no pain at rest\par Pain is concentrate to the upper thigh area\par no swelling or redness - \par PMH of CAD - previous MI and stroke at age 45\par \par Denies any fevers, chill, rashes, weight loss,fatigue,  hair loss,  dry eyes or mouth, mouth sores, Raynaud's. chest pain or SOB, GI or , numbness/tingling\par \par

## 2021-03-24 LAB
ALBUMIN SERPL ELPH-MCNC: 4.7 G/DL
ALP BLD-CCNC: 76 U/L
ALT SERPL-CCNC: 25 U/L
ANION GAP SERPL CALC-SCNC: 15 MMOL/L
AST SERPL-CCNC: 19 U/L
BASOPHILS # BLD AUTO: 0.03 K/UL
BASOPHILS NFR BLD AUTO: 0.5 %
BILIRUB SERPL-MCNC: 0.5 MG/DL
BUN SERPL-MCNC: 23 MG/DL
CALCIUM SERPL-MCNC: 9.6 MG/DL
CCP AB SER IA-ACNC: <8 UNITS
CHLORIDE SERPL-SCNC: 100 MMOL/L
CO2 SERPL-SCNC: 24 MMOL/L
CONFIRM: 30.9 SEC
CREAT SERPL-MCNC: 1.52 MG/DL
CRP SERPL-MCNC: 17 MG/L
DEPRECATED D DIMER PPP IA-ACNC: 177 NG/ML DDU
DNA PLOIDY SPEC FC-IMP: NORMAL
DRVVT IMM 1:2 NP PPP: NORMAL
DRVVT SCREEN TO CONFIRM RATIO: 0.98 RATIO
EOSINOPHIL # BLD AUTO: 0.1 K/UL
EOSINOPHIL NFR BLD AUTO: 1.8 %
ERYTHROCYTE [SEDIMENTATION RATE] IN BLOOD BY WESTERGREN METHOD: 47 MM/HR
GLUCOSE SERPL-MCNC: 152 MG/DL
HCT VFR BLD CALC: 41.2 %
HGB BLD-MCNC: 13.2 G/DL
IMM GRANULOCYTES NFR BLD AUTO: 0.2 %
LYMPHOCYTES # BLD AUTO: 2.2 K/UL
LYMPHOCYTES NFR BLD AUTO: 39.4 %
MAN DIFF?: NORMAL
MCHC RBC-ENTMCNC: 29.4 PG
MCHC RBC-ENTMCNC: 32 GM/DL
MCV RBC AUTO: 91.8 FL
MONOCYTES # BLD AUTO: 0.53 K/UL
MONOCYTES NFR BLD AUTO: 9.5 %
NEUTROPHILS # BLD AUTO: 2.72 K/UL
NEUTROPHILS NFR BLD AUTO: 48.6 %
PLATELET # BLD AUTO: 229 K/UL
POTASSIUM SERPL-SCNC: 3.9 MMOL/L
PROT SERPL-MCNC: 7.7 G/DL
RBC # BLD: 4.49 M/UL
RBC # FLD: 14.7 %
RF+CCP IGG SER-IMP: NEGATIVE
RHEUMATOID FACT SER QL: <10 IU/ML
SCREEN DRVVT: 40.6 SEC
SODIUM SERPL-SCNC: 140 MMOL/L
WBC # FLD AUTO: 5.59 K/UL

## 2021-05-12 ENCOUNTER — NON-APPOINTMENT (OUTPATIENT)
Age: 51
End: 2021-05-12

## 2021-09-25 ENCOUNTER — APPOINTMENT (OUTPATIENT)
Dept: DISASTER EMERGENCY | Facility: CLINIC | Age: 51
End: 2021-09-25

## 2021-10-15 VITALS
SYSTOLIC BLOOD PRESSURE: 123 MMHG | OXYGEN SATURATION: 100 % | TEMPERATURE: 98 F | RESPIRATION RATE: 18 BRPM | HEART RATE: 99 BPM | DIASTOLIC BLOOD PRESSURE: 78 MMHG

## 2021-10-15 RX ORDER — ASCORBIC ACID 60 MG
1 TABLET,CHEWABLE ORAL
Qty: 0 | Refills: 0 | DISCHARGE

## 2021-10-15 RX ORDER — METFORMIN HYDROCHLORIDE 850 MG/1
1 TABLET ORAL
Qty: 0 | Refills: 0 | DISCHARGE

## 2021-10-15 RX ORDER — ATORVASTATIN CALCIUM 80 MG/1
1 TABLET, FILM COATED ORAL
Qty: 0 | Refills: 0 | DISCHARGE

## 2021-10-15 RX ORDER — ASPIRIN/CALCIUM CARB/MAGNESIUM 324 MG
1 TABLET ORAL
Qty: 0 | Refills: 0 | DISCHARGE

## 2021-10-15 RX ORDER — CHLORHEXIDINE GLUCONATE 213 G/1000ML
1 SOLUTION TOPICAL ONCE
Refills: 0 | Status: DISCONTINUED | OUTPATIENT
Start: 2021-11-02 | End: 2021-11-02

## 2021-10-15 RX ORDER — FERROUS SULFATE 325(65) MG
1 TABLET ORAL
Qty: 0 | Refills: 0 | DISCHARGE

## 2021-10-15 NOTE — H&P ADULT - NSICDXFAMILYHX_GEN_ALL_CORE_FT
FAMILY HISTORY:  Father  Still living? No  Family history of cerebrovascular accident (CVA) in father, Age at diagnosis: 61-70    Mother  Still living? No  FH: MI (myocardial infarction), Age at diagnosis: 71-80    Sibling  Still living? Yes, Estimated age: Age Unknown  Family history of hypertension, Age at diagnosis: Age Unknown

## 2021-10-15 NOTE — H&P ADULT - HISTORY OF PRESENT ILLNESS
Covid: tomorrow @ 34-07 [facility]  Cardiologist: Dr Ambriz  Pharmacy: [facility]   Escort:     CKD pt - hydrate    50 yo M w/  PMHx HTN, HLD, DM II, CAD managed medically, CVA (2017, residual left hemianopsia), hx LV thrombus (2017), CKD? (most recent Cr 1.3-1.4), presented to outpatient cardiologist, Dr Ambriz, c/o severe R gluteal claudication upon 1-2 blocks ambulation described as pressure and burning. Pt denies fevers, chills, cough, CP, palpitations, SOB, lightheadedness, n/v, LE edema, skin changes, ulcers, hair loss. CTA abd w/ runoff (per MD note) revealed severe R DANIELA stenosis.       In light of pt risk factors, [place] Class III symptoms, and positice CTA, pt now presents for LE angiogram.   Covid: tomorrow @ 95-25 [facility]  Cardiologist: Dr Ambriz  Pharmacy: [facility] [phone]  Escort:     CKD pt - hydrate    52 yo M w/  PMHx HTN, HLD, DM II, CAD managed medically, CVA (2017, residual left hemianopsia), hx LV thrombus (2017), CKD? (most recent Cr 1.3-1.4), presented to outpatient cardiologist, Dr Ambriz, c/o severe R gluteal claudication upon 1-2 blocks ambulation described as pressure and burning. Pt denies fevers, chills, cough, CP, palpitations, SOB, lightheadedness, n/v, LE edema, skin changes, ulcers, hair loss. CTA abd w/ runoff (per MD note) revealed severe R DANIELA stenosis.       In light of pt risk factors, [place] Class III symptoms, and positice CTA, pt now presents for LE angiogram.   Covid: 10/30 @ 95-25 [facility]  Cardiologist: Dr Ambriz  Pharmacy: [facility] [phone]  Escort:     CKD pt - hydrate    50 y/o M w/  PMHx HTN, HLD, DM II, MI 2016 --> managed medically, CVA' 2016 with residual left hemianopsia, hx LV thrombus (2017), CKD (most recent Cr 1.3-1.4), Covid hospitalization in 12/2020 x 5 days (no intubation), who presented to outpatient cardiologist, Dr Ambriz, with c/o severe R LE claudication upon half a block ambulation, described as pressure and burning.  Pt denies fevers, chills, cough, CP, palpitations, SOB, lightheadedness, n/v, LE edema, skin changes, ulcers, hair loss.  CTA abd w/ runoff (per MD note) revealed severe R DANIELA stenosis.     ECHO from 8/31/16: EF 35-40%, apical segments of LV severely hypokinetic.    In light of pt risk factors, [place] Class III symptoms, and abnormal CTA, pt now presents for LE angiogram with possible intervention if clinically indicated.   Covid: 10/30 @ 95-25 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 Pt denies fevers, chills, cough, CP, palpitations, SOB, lightheadedness, n/v, LE edema, skin changes, ulcers, hair loss. CTA abd w/ runoff (per MD note) revealed severe R DANIELA stenosis. ECHO from 8/31/16: EF 35-40%, apical segments of LV severely hypokinetic.  In light of pt risk factors, [place] Class III symptoms, and abnormal CTA, pt now presents for LE angiogram with possible intervention if clinically indicated.

## 2021-10-15 NOTE — H&P ADULT - ASSESSMENT
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 Pt denies fevers, chills, cough, CP, palpitations, SOB, lightheadedness, n/v, LE edema, skin changes, ulcers, hair loss. CTA abd w/ runoff (per MD note) revealed severe R DANIELA stenosis. ECHO from 8/31/16: EF 35-40%, apical segments of LV severely hypokinetic.  In light of pt risk factors, [place] Class III symptoms, and abnormal CTA, pt now presents for LE angiogram with possible intervention if clinically indicated.    EKG: NSR @99bpm w/ pathological Q waves in leads III and aVF.  ASA 3			Mallampati class: 2    -No Known Allergies  -H/H = __________. Pt denies BRBPR, hematuria, hematochezia, melena. Pt reports good compliance w/ home ASA 81mg QD, stating his last dose was yesterday (11/1). Pt loaded w/ ASA 81mg x1 and Plavix 600mg x1,  -BUN/Cr = __________. EF 35-40%. Euvolemic on exam. Hx of CKD. IV NS @ 75cc/hr x 4hrs started pre procedure    Sedation Plan:   Moderate  Patient Is Suitable Candidate For Sedation?     Yes    Risks & benefits of procedure and alternative therapy have been explained to the patient including but not limited to: allergic reaction, bleeding with possible need for blood transfusion, infection, renal and vascular compromise, limb damage, arrhythmia, stroke, vessel dissection/perforation, myocardial infarction, and emergent CABG. Informed consent obtained at bedside and included in chart.

## 2021-10-15 NOTE — H&P ADULT - NSICDXPASTMEDICALHX_GEN_ALL_CORE_FT
PAST MEDICAL HISTORY:  CKD (chronic kidney disease)     CVA (cerebrovascular accident) 2016 with residual left hemiaanopsia    Diabetes type 2, controlled     Essential hypertension     Hypertension     Old myocardial infarct 2016    Pneumonia due to COVID-19 virus 12/2020

## 2021-10-16 ENCOUNTER — APPOINTMENT (OUTPATIENT)
Dept: DISASTER EMERGENCY | Facility: CLINIC | Age: 51
End: 2021-10-16

## 2021-10-17 LAB — SARS-COV-2 N GENE NPH QL NAA+PROBE: NOT DETECTED

## 2021-10-29 RX ORDER — LISINOPRIL/HYDROCHLOROTHIAZIDE 10-12.5 MG
1 TABLET ORAL
Qty: 0 | Refills: 0 | DISCHARGE

## 2021-10-29 RX ORDER — PANTOPRAZOLE SODIUM 20 MG/1
1 TABLET, DELAYED RELEASE ORAL
Qty: 0 | Refills: 0 | DISCHARGE

## 2021-10-29 RX ORDER — METFORMIN HYDROCHLORIDE 850 MG/1
1 TABLET ORAL
Qty: 0 | Refills: 0 | DISCHARGE

## 2021-10-29 RX ORDER — DIPHENHYDRAMINE HCL 50 MG
1 CAPSULE ORAL
Qty: 0 | Refills: 0 | DISCHARGE

## 2021-10-29 RX ORDER — ATORVASTATIN CALCIUM 80 MG/1
1 TABLET, FILM COATED ORAL
Qty: 0 | Refills: 0 | DISCHARGE

## 2021-10-29 RX ORDER — AMLODIPINE BESYLATE 2.5 MG/1
1 TABLET ORAL
Qty: 0 | Refills: 0 | DISCHARGE

## 2021-10-30 ENCOUNTER — APPOINTMENT (OUTPATIENT)
Dept: DISASTER EMERGENCY | Facility: CLINIC | Age: 51
End: 2021-10-30

## 2021-10-30 DIAGNOSIS — Z01.818 ENCOUNTER FOR OTHER PREPROCEDURAL EXAMINATION: ICD-10-CM

## 2021-10-31 LAB — SARS-COV-2 N GENE NPH QL NAA+PROBE: NOT DETECTED

## 2021-11-02 ENCOUNTER — INPATIENT (INPATIENT)
Facility: HOSPITAL | Age: 51
LOS: 0 days | Discharge: HOME | DRG: 253 | End: 2021-11-03
Attending: INTERNAL MEDICINE | Admitting: INTERNAL MEDICINE
Payer: COMMERCIAL

## 2021-11-02 LAB
A1C WITH ESTIMATED AVERAGE GLUCOSE RESULT: 6.1 % — HIGH (ref 4–5.6)
ALBUMIN SERPL ELPH-MCNC: 4.5 G/DL — SIGNIFICANT CHANGE UP (ref 3.3–5)
ALP SERPL-CCNC: 111 U/L — SIGNIFICANT CHANGE UP (ref 40–120)
ALT FLD-CCNC: 25 U/L — SIGNIFICANT CHANGE UP (ref 10–45)
ANION GAP SERPL CALC-SCNC: 17 MMOL/L — SIGNIFICANT CHANGE UP (ref 5–17)
APTT BLD: 33.4 SEC — SIGNIFICANT CHANGE UP (ref 27.5–35.5)
AST SERPL-CCNC: 24 U/L — SIGNIFICANT CHANGE UP (ref 10–40)
BASOPHILS # BLD AUTO: 0.03 K/UL — SIGNIFICANT CHANGE UP (ref 0–0.2)
BASOPHILS NFR BLD AUTO: 0.4 % — SIGNIFICANT CHANGE UP (ref 0–2)
BILIRUB DIRECT SERPL-MCNC: <0.2 MG/DL — SIGNIFICANT CHANGE UP (ref 0–0.2)
BILIRUB INDIRECT FLD-MCNC: SIGNIFICANT CHANGE UP MG/DL (ref 0.2–1)
BILIRUB SERPL-MCNC: 0.5 MG/DL — SIGNIFICANT CHANGE UP (ref 0.2–1.2)
BUN SERPL-MCNC: 24 MG/DL — HIGH (ref 7–23)
CALCIUM SERPL-MCNC: 10.1 MG/DL — SIGNIFICANT CHANGE UP (ref 8.4–10.5)
CHLORIDE SERPL-SCNC: 100 MMOL/L — SIGNIFICANT CHANGE UP (ref 96–108)
CHOLEST SERPL-MCNC: 131 MG/DL — SIGNIFICANT CHANGE UP
CO2 SERPL-SCNC: 23 MMOL/L — SIGNIFICANT CHANGE UP (ref 22–31)
CREAT SERPL-MCNC: 1.54 MG/DL — HIGH (ref 0.5–1.3)
EOSINOPHIL # BLD AUTO: 0.02 K/UL — SIGNIFICANT CHANGE UP (ref 0–0.5)
EOSINOPHIL NFR BLD AUTO: 0.3 % — SIGNIFICANT CHANGE UP (ref 0–6)
ESTIMATED AVERAGE GLUCOSE: 128 MG/DL — HIGH (ref 68–114)
GLUCOSE BLDC GLUCOMTR-MCNC: 120 MG/DL — HIGH (ref 70–99)
GLUCOSE BLDC GLUCOMTR-MCNC: 139 MG/DL — HIGH (ref 70–99)
GLUCOSE SERPL-MCNC: 115 MG/DL — HIGH (ref 70–99)
HCT VFR BLD CALC: 37 % — LOW (ref 39–50)
HDLC SERPL-MCNC: 33 MG/DL — LOW
HGB BLD-MCNC: 12 G/DL — LOW (ref 13–17)
IMM GRANULOCYTES NFR BLD AUTO: 0.4 % — SIGNIFICANT CHANGE UP (ref 0–1.5)
INR BLD: 1.14 — SIGNIFICANT CHANGE UP (ref 0.88–1.16)
LIPID PNL WITH DIRECT LDL SERPL: 55 MG/DL — SIGNIFICANT CHANGE UP
LYMPHOCYTES # BLD AUTO: 2.05 K/UL — SIGNIFICANT CHANGE UP (ref 1–3.3)
LYMPHOCYTES # BLD AUTO: 27.2 % — SIGNIFICANT CHANGE UP (ref 13–44)
MCHC RBC-ENTMCNC: 28 PG — SIGNIFICANT CHANGE UP (ref 27–34)
MCHC RBC-ENTMCNC: 32.4 GM/DL — SIGNIFICANT CHANGE UP (ref 32–36)
MCV RBC AUTO: 86.2 FL — SIGNIFICANT CHANGE UP (ref 80–100)
MONOCYTES # BLD AUTO: 0.51 K/UL — SIGNIFICANT CHANGE UP (ref 0–0.9)
MONOCYTES NFR BLD AUTO: 6.8 % — SIGNIFICANT CHANGE UP (ref 2–14)
NEUTROPHILS # BLD AUTO: 4.89 K/UL — SIGNIFICANT CHANGE UP (ref 1.8–7.4)
NEUTROPHILS NFR BLD AUTO: 64.9 % — SIGNIFICANT CHANGE UP (ref 43–77)
NON HDL CHOLESTEROL: 98 MG/DL — SIGNIFICANT CHANGE UP
NRBC # BLD: 0 /100 WBCS — SIGNIFICANT CHANGE UP (ref 0–0)
PLATELET # BLD AUTO: 318 K/UL — SIGNIFICANT CHANGE UP (ref 150–400)
POTASSIUM SERPL-MCNC: 3.8 MMOL/L — SIGNIFICANT CHANGE UP (ref 3.5–5.3)
POTASSIUM SERPL-SCNC: 3.8 MMOL/L — SIGNIFICANT CHANGE UP (ref 3.5–5.3)
PROT SERPL-MCNC: 8.6 G/DL — HIGH (ref 6–8.3)
PROTHROM AB SERPL-ACNC: 13.6 SEC — SIGNIFICANT CHANGE UP (ref 10.6–13.6)
RBC # BLD: 4.29 M/UL — SIGNIFICANT CHANGE UP (ref 4.2–5.8)
RBC # FLD: 13.6 % — SIGNIFICANT CHANGE UP (ref 10.3–14.5)
SODIUM SERPL-SCNC: 140 MMOL/L — SIGNIFICANT CHANGE UP (ref 135–145)
TRIGL SERPL-MCNC: 214 MG/DL — HIGH
WBC # BLD: 7.53 K/UL — SIGNIFICANT CHANGE UP (ref 3.8–10.5)
WBC # FLD AUTO: 7.53 K/UL — SIGNIFICANT CHANGE UP (ref 3.8–10.5)

## 2021-11-02 PROCEDURE — 75710 ARTERY X-RAYS ARM/LEG: CPT | Mod: 26,59

## 2021-11-02 PROCEDURE — 37221: CPT

## 2021-11-02 RX ORDER — CLOPIDOGREL BISULFATE 75 MG/1
600 TABLET, FILM COATED ORAL ONCE
Refills: 0 | Status: COMPLETED | OUTPATIENT
Start: 2021-11-02 | End: 2021-11-02

## 2021-11-02 RX ORDER — ATORVASTATIN CALCIUM 80 MG/1
80 TABLET, FILM COATED ORAL AT BEDTIME
Refills: 0 | Status: DISCONTINUED | OUTPATIENT
Start: 2021-11-02 | End: 2021-11-03

## 2021-11-02 RX ORDER — SODIUM CHLORIDE 9 MG/ML
500 INJECTION INTRAMUSCULAR; INTRAVENOUS; SUBCUTANEOUS
Refills: 0 | Status: DISCONTINUED | OUTPATIENT
Start: 2021-11-02 | End: 2021-11-03

## 2021-11-02 RX ORDER — HYDROCHLOROTHIAZIDE 25 MG
25 TABLET ORAL DAILY
Refills: 0 | Status: DISCONTINUED | OUTPATIENT
Start: 2021-11-03 | End: 2021-11-03

## 2021-11-02 RX ORDER — ASPIRIN/CALCIUM CARB/MAGNESIUM 324 MG
81 TABLET ORAL ONCE
Refills: 0 | Status: COMPLETED | OUTPATIENT
Start: 2021-11-02 | End: 2021-11-02

## 2021-11-02 RX ORDER — POTASSIUM CHLORIDE 20 MEQ
20 PACKET (EA) ORAL ONCE
Refills: 0 | Status: COMPLETED | OUTPATIENT
Start: 2021-11-02 | End: 2021-11-02

## 2021-11-02 RX ORDER — AMLODIPINE BESYLATE 2.5 MG/1
5 TABLET ORAL DAILY
Refills: 0 | Status: DISCONTINUED | OUTPATIENT
Start: 2021-11-03 | End: 2021-11-03

## 2021-11-02 RX ORDER — LISINOPRIL 2.5 MG/1
20 TABLET ORAL DAILY
Refills: 0 | Status: DISCONTINUED | OUTPATIENT
Start: 2021-11-03 | End: 2021-11-03

## 2021-11-02 RX ORDER — PANTOPRAZOLE SODIUM 20 MG/1
40 TABLET, DELAYED RELEASE ORAL
Refills: 0 | Status: DISCONTINUED | OUTPATIENT
Start: 2021-11-03 | End: 2021-11-03

## 2021-11-02 RX ORDER — CLOPIDOGREL BISULFATE 75 MG/1
75 TABLET, FILM COATED ORAL DAILY
Refills: 0 | Status: DISCONTINUED | OUTPATIENT
Start: 2021-11-03 | End: 2021-11-03

## 2021-11-02 RX ORDER — SODIUM CHLORIDE 9 MG/ML
500 INJECTION INTRAMUSCULAR; INTRAVENOUS; SUBCUTANEOUS
Refills: 0 | Status: DISCONTINUED | OUTPATIENT
Start: 2021-11-02 | End: 2021-11-02

## 2021-11-02 RX ORDER — METOPROLOL TARTRATE 50 MG
200 TABLET ORAL DAILY
Refills: 0 | Status: DISCONTINUED | OUTPATIENT
Start: 2021-11-03 | End: 2021-11-03

## 2021-11-02 RX ORDER — ASPIRIN/CALCIUM CARB/MAGNESIUM 324 MG
81 TABLET ORAL DAILY
Refills: 0 | Status: DISCONTINUED | OUTPATIENT
Start: 2021-11-03 | End: 2021-11-03

## 2021-11-02 RX ADMIN — ATORVASTATIN CALCIUM 80 MILLIGRAM(S): 80 TABLET, FILM COATED ORAL at 22:07

## 2021-11-02 RX ADMIN — Medication 20 MILLIEQUIVALENT(S): at 19:35

## 2021-11-02 RX ADMIN — Medication 81 MILLIGRAM(S): at 13:34

## 2021-11-02 RX ADMIN — SODIUM CHLORIDE 75 MILLILITER(S): 9 INJECTION INTRAMUSCULAR; INTRAVENOUS; SUBCUTANEOUS at 12:28

## 2021-11-02 RX ADMIN — CLOPIDOGREL BISULFATE 600 MILLIGRAM(S): 75 TABLET, FILM COATED ORAL at 13:35

## 2021-11-02 RX ADMIN — SODIUM CHLORIDE 50 MILLILITER(S): 9 INJECTION INTRAMUSCULAR; INTRAVENOUS; SUBCUTANEOUS at 16:00

## 2021-11-02 RX ADMIN — SODIUM CHLORIDE 75 MILLILITER(S): 9 INJECTION INTRAMUSCULAR; INTRAVENOUS; SUBCUTANEOUS at 13:35

## 2021-11-02 NOTE — PATIENT PROFILE ADULT - VISION (WITH CORRECTIVE LENSES IF THE PATIENT USUALLY WEARS THEM):
L hemianopsia/Partially impaired: cannot see medication labels or newsprint, but can see obstacles in path, and the surrounding layout; can count fingers at arm's length

## 2021-11-02 NOTE — PROGRESS NOTE ADULT - SUBJECTIVE AND OBJECTIVE BOX
Interventional Cardiology PA Sheath Pull Note    Patient without complaints. VSS    Pre-Sheath Removal: Left groin 5Fr arterial sheath in place. No hematoma. No bleed.    Pulses: Left DP Doppler     Hemostasis Achieved with: 18 minutes manual pressure.    Vasovagal Reaction: Yes    Meds Given: None.    Post-Sheath Removal: Left groin no hematoma, no bleed, no bruit.     Pulses: Left DP Doppler    A/P:  S/p peripheral shockwave/PTCA/stent right common iliac    - Continue bedrest (pt given instructions)  - Continue to monitor

## 2021-11-03 ENCOUNTER — TRANSCRIPTION ENCOUNTER (OUTPATIENT)
Age: 51
End: 2021-11-03

## 2021-11-03 VITALS — TEMPERATURE: 98 F

## 2021-11-03 LAB
ALBUMIN SERPL ELPH-MCNC: 4.3 G/DL — SIGNIFICANT CHANGE UP (ref 3.3–5)
ALP SERPL-CCNC: 113 U/L — SIGNIFICANT CHANGE UP (ref 40–120)
ALT FLD-CCNC: 21 U/L — SIGNIFICANT CHANGE UP (ref 10–45)
ANION GAP SERPL CALC-SCNC: 13 MMOL/L — SIGNIFICANT CHANGE UP (ref 5–17)
ANION GAP SERPL CALC-SCNC: 19 MMOL/L — HIGH (ref 5–17)
AST SERPL-CCNC: 19 U/L — SIGNIFICANT CHANGE UP (ref 10–40)
BASOPHILS # BLD AUTO: 0.02 K/UL — SIGNIFICANT CHANGE UP (ref 0–0.2)
BASOPHILS NFR BLD AUTO: 0.3 % — SIGNIFICANT CHANGE UP (ref 0–2)
BILIRUB SERPL-MCNC: 0.6 MG/DL — SIGNIFICANT CHANGE UP (ref 0.2–1.2)
BUN SERPL-MCNC: 19 MG/DL — SIGNIFICANT CHANGE UP (ref 7–23)
BUN SERPL-MCNC: 20 MG/DL — SIGNIFICANT CHANGE UP (ref 7–23)
CALCIUM SERPL-MCNC: 10.2 MG/DL — SIGNIFICANT CHANGE UP (ref 8.4–10.5)
CALCIUM SERPL-MCNC: 9.6 MG/DL — SIGNIFICANT CHANGE UP (ref 8.4–10.5)
CHLORIDE SERPL-SCNC: 101 MMOL/L — SIGNIFICANT CHANGE UP (ref 96–108)
CHLORIDE SERPL-SCNC: 102 MMOL/L — SIGNIFICANT CHANGE UP (ref 96–108)
CO2 SERPL-SCNC: 20 MMOL/L — LOW (ref 22–31)
CO2 SERPL-SCNC: 25 MMOL/L — SIGNIFICANT CHANGE UP (ref 22–31)
COVID-19 NUCLEOCAPSID GAM AB INTERP: POSITIVE
COVID-19 NUCLEOCAPSID TOTAL GAM ANTIBODY RESULT: 128 INDEX — HIGH
COVID-19 SPIKE DOMAIN AB INTERP: POSITIVE
COVID-19 SPIKE DOMAIN ANTIBODY RESULT: >250 U/ML — HIGH
CREAT SERPL-MCNC: 1.45 MG/DL — HIGH (ref 0.5–1.3)
CREAT SERPL-MCNC: 1.54 MG/DL — HIGH (ref 0.5–1.3)
EOSINOPHIL # BLD AUTO: 0.03 K/UL — SIGNIFICANT CHANGE UP (ref 0–0.5)
EOSINOPHIL NFR BLD AUTO: 0.4 % — SIGNIFICANT CHANGE UP (ref 0–6)
GLUCOSE BLDC GLUCOMTR-MCNC: 113 MG/DL — HIGH (ref 70–99)
GLUCOSE BLDC GLUCOMTR-MCNC: 145 MG/DL — HIGH (ref 70–99)
GLUCOSE SERPL-MCNC: 112 MG/DL — HIGH (ref 70–99)
GLUCOSE SERPL-MCNC: 146 MG/DL — HIGH (ref 70–99)
HCT VFR BLD CALC: 33.7 % — LOW (ref 39–50)
HCT VFR BLD CALC: 37 % — LOW (ref 39–50)
HGB BLD-MCNC: 10.6 G/DL — LOW (ref 13–17)
HGB BLD-MCNC: 11.6 G/DL — LOW (ref 13–17)
IMM GRANULOCYTES NFR BLD AUTO: 0.1 % — SIGNIFICANT CHANGE UP (ref 0–1.5)
LYMPHOCYTES # BLD AUTO: 2.53 K/UL — SIGNIFICANT CHANGE UP (ref 1–3.3)
LYMPHOCYTES # BLD AUTO: 35.6 % — SIGNIFICANT CHANGE UP (ref 13–44)
MAGNESIUM SERPL-MCNC: 1.5 MG/DL — LOW (ref 1.6–2.6)
MAGNESIUM SERPL-MCNC: 3.9 MG/DL — HIGH (ref 1.6–2.6)
MCHC RBC-ENTMCNC: 27.5 PG — SIGNIFICANT CHANGE UP (ref 27–34)
MCHC RBC-ENTMCNC: 28.2 PG — SIGNIFICANT CHANGE UP (ref 27–34)
MCHC RBC-ENTMCNC: 31.4 GM/DL — LOW (ref 32–36)
MCHC RBC-ENTMCNC: 31.5 GM/DL — LOW (ref 32–36)
MCV RBC AUTO: 87.7 FL — SIGNIFICANT CHANGE UP (ref 80–100)
MCV RBC AUTO: 89.6 FL — SIGNIFICANT CHANGE UP (ref 80–100)
MONOCYTES # BLD AUTO: 0.45 K/UL — SIGNIFICANT CHANGE UP (ref 0–0.9)
MONOCYTES NFR BLD AUTO: 6.3 % — SIGNIFICANT CHANGE UP (ref 2–14)
NEUTROPHILS # BLD AUTO: 4.06 K/UL — SIGNIFICANT CHANGE UP (ref 1.8–7.4)
NEUTROPHILS NFR BLD AUTO: 57.3 % — SIGNIFICANT CHANGE UP (ref 43–77)
NRBC # BLD: 0 /100 WBCS — SIGNIFICANT CHANGE UP (ref 0–0)
NRBC # BLD: 0 /100 WBCS — SIGNIFICANT CHANGE UP (ref 0–0)
NT-PROBNP SERPL-SCNC: 397 PG/ML — HIGH (ref 0–300)
PLATELET # BLD AUTO: 297 K/UL — SIGNIFICANT CHANGE UP (ref 150–400)
PLATELET # BLD AUTO: 371 K/UL — SIGNIFICANT CHANGE UP (ref 150–400)
POTASSIUM SERPL-MCNC: 3.7 MMOL/L — SIGNIFICANT CHANGE UP (ref 3.5–5.3)
POTASSIUM SERPL-MCNC: 4 MMOL/L — SIGNIFICANT CHANGE UP (ref 3.5–5.3)
POTASSIUM SERPL-SCNC: 3.7 MMOL/L — SIGNIFICANT CHANGE UP (ref 3.5–5.3)
POTASSIUM SERPL-SCNC: 4 MMOL/L — SIGNIFICANT CHANGE UP (ref 3.5–5.3)
PROT SERPL-MCNC: 8.4 G/DL — HIGH (ref 6–8.3)
RBC # BLD: 3.76 M/UL — LOW (ref 4.2–5.8)
RBC # BLD: 4.22 M/UL — SIGNIFICANT CHANGE UP (ref 4.2–5.8)
RBC # FLD: 13.6 % — SIGNIFICANT CHANGE UP (ref 10.3–14.5)
RBC # FLD: 13.7 % — SIGNIFICANT CHANGE UP (ref 10.3–14.5)
SARS-COV-2 IGG+IGM SERPL QL IA: 128 INDEX — HIGH
SARS-COV-2 IGG+IGM SERPL QL IA: >250 U/ML — HIGH
SARS-COV-2 IGG+IGM SERPL QL IA: POSITIVE
SARS-COV-2 IGG+IGM SERPL QL IA: POSITIVE
SODIUM SERPL-SCNC: 140 MMOL/L — SIGNIFICANT CHANGE UP (ref 135–145)
SODIUM SERPL-SCNC: 140 MMOL/L — SIGNIFICANT CHANGE UP (ref 135–145)
WBC # BLD: 10.1 K/UL — SIGNIFICANT CHANGE UP (ref 3.8–10.5)
WBC # BLD: 7.1 K/UL — SIGNIFICANT CHANGE UP (ref 3.8–10.5)
WBC # FLD AUTO: 10.1 K/UL — SIGNIFICANT CHANGE UP (ref 3.8–10.5)
WBC # FLD AUTO: 7.1 K/UL — SIGNIFICANT CHANGE UP (ref 3.8–10.5)

## 2021-11-03 PROCEDURE — 85610 PROTHROMBIN TIME: CPT

## 2021-11-03 PROCEDURE — C1887: CPT

## 2021-11-03 PROCEDURE — C1725: CPT

## 2021-11-03 PROCEDURE — C1874: CPT

## 2021-11-03 PROCEDURE — 85027 COMPLETE CBC AUTOMATED: CPT

## 2021-11-03 PROCEDURE — 80048 BASIC METABOLIC PNL TOTAL CA: CPT

## 2021-11-03 PROCEDURE — 83880 ASSAY OF NATRIURETIC PEPTIDE: CPT

## 2021-11-03 PROCEDURE — 80061 LIPID PANEL: CPT

## 2021-11-03 PROCEDURE — C1769: CPT

## 2021-11-03 PROCEDURE — C1889: CPT

## 2021-11-03 PROCEDURE — 86769 SARS-COV-2 COVID-19 ANTIBODY: CPT

## 2021-11-03 PROCEDURE — 82962 GLUCOSE BLOOD TEST: CPT

## 2021-11-03 PROCEDURE — C1894: CPT

## 2021-11-03 PROCEDURE — 36415 COLL VENOUS BLD VENIPUNCTURE: CPT

## 2021-11-03 PROCEDURE — 85730 THROMBOPLASTIN TIME PARTIAL: CPT

## 2021-11-03 PROCEDURE — C1760: CPT

## 2021-11-03 PROCEDURE — 99239 HOSP IP/OBS DSCHRG MGMT >30: CPT

## 2021-11-03 PROCEDURE — 82248 BILIRUBIN DIRECT: CPT

## 2021-11-03 PROCEDURE — 83036 HEMOGLOBIN GLYCOSYLATED A1C: CPT

## 2021-11-03 PROCEDURE — 80053 COMPREHEN METABOLIC PANEL: CPT

## 2021-11-03 PROCEDURE — 83735 ASSAY OF MAGNESIUM: CPT

## 2021-11-03 PROCEDURE — 85025 COMPLETE CBC W/AUTO DIFF WBC: CPT

## 2021-11-03 RX ORDER — METOPROLOL TARTRATE 50 MG
1 TABLET ORAL
Qty: 30 | Refills: 3
Start: 2021-11-03 | End: 2022-03-02

## 2021-11-03 RX ORDER — MAGNESIUM SULFATE 500 MG/ML
4 VIAL (ML) INJECTION ONCE
Refills: 0 | Status: COMPLETED | OUTPATIENT
Start: 2021-11-03 | End: 2021-11-03

## 2021-11-03 RX ORDER — CLOPIDOGREL BISULFATE 75 MG/1
1 TABLET, FILM COATED ORAL
Qty: 30 | Refills: 11
Start: 2021-11-03 | End: 2022-10-28

## 2021-11-03 RX ORDER — ASPIRIN/CALCIUM CARB/MAGNESIUM 324 MG
1 TABLET ORAL
Qty: 0 | Refills: 0 | DISCHARGE

## 2021-11-03 RX ORDER — ASPIRIN/CALCIUM CARB/MAGNESIUM 324 MG
1 TABLET ORAL
Qty: 30 | Refills: 11
Start: 2021-11-03 | End: 2022-10-28

## 2021-11-03 RX ORDER — POTASSIUM CHLORIDE 20 MEQ
40 PACKET (EA) ORAL ONCE
Refills: 0 | Status: COMPLETED | OUTPATIENT
Start: 2021-11-03 | End: 2021-11-03

## 2021-11-03 RX ORDER — METOPROLOL TARTRATE 50 MG
1 TABLET ORAL
Qty: 0 | Refills: 0 | DISCHARGE

## 2021-11-03 RX ORDER — SODIUM CHLORIDE 9 MG/ML
250 INJECTION INTRAMUSCULAR; INTRAVENOUS; SUBCUTANEOUS ONCE
Refills: 0 | Status: COMPLETED | OUTPATIENT
Start: 2021-11-03 | End: 2021-11-03

## 2021-11-03 RX ADMIN — Medication 200 MILLIGRAM(S): at 06:12

## 2021-11-03 RX ADMIN — Medication 100 GRAM(S): at 10:37

## 2021-11-03 RX ADMIN — CLOPIDOGREL BISULFATE 75 MILLIGRAM(S): 75 TABLET, FILM COATED ORAL at 10:36

## 2021-11-03 RX ADMIN — AMLODIPINE BESYLATE 5 MILLIGRAM(S): 2.5 TABLET ORAL at 06:15

## 2021-11-03 RX ADMIN — PANTOPRAZOLE SODIUM 40 MILLIGRAM(S): 20 TABLET, DELAYED RELEASE ORAL at 06:13

## 2021-11-03 RX ADMIN — SODIUM CHLORIDE 250 MILLILITER(S): 9 INJECTION INTRAMUSCULAR; INTRAVENOUS; SUBCUTANEOUS at 12:03

## 2021-11-03 RX ADMIN — Medication 81 MILLIGRAM(S): at 10:36

## 2021-11-03 RX ADMIN — Medication 40 MILLIEQUIVALENT(S): at 07:55

## 2021-11-03 NOTE — DISCHARGE NOTE PROVIDER - CARE PROVIDER_API CALL
Ld Ambriz)  Cardiology; Internal Medicine  [address] [place] [address]  Phone: [phone]  Fax: [phone]  Follow Up Time: 1 week

## 2021-11-03 NOTE — CHART NOTE - NSCHARTNOTEFT_GEN_A_CORE
Patient walking with nurse, before discharge, started to feel dizzy and lightheaded, sat down on chair with RN Yasemin Ventura. As per RN, eyes rolled backwards and patient started urinated on self. Rapid response called. Patient's HR 48, BP: 86/45 mmHg. Patient likely with vasovagal episode as patient awake and aware of his surroundings within seconds. No LOC. No fall noted.  Rapid response cancelled. Patient started with  cc over 1 hour. Will continue with 1 hour BP checks and continue to monitor.

## 2021-11-03 NOTE — DISCHARGE NOTE NURSING/CASE MANAGEMENT/SOCIAL WORK - PATIENT PORTAL LINK FT
You can access the FollowMyHealth Patient Portal offered by [facility] by registering at the following website: [email or web address]. By joining [facility]’s FollowMyHealth portal, you will also be able to view your health information using other applications (apps) compatible with our system.

## 2021-11-03 NOTE — DISCHARGE NOTE PROVIDER - NSDCMRMEDTOKEN_GEN_ALL_CORE_FT
amLODIPine 5 mg oral tablet: 1 tab(s) orally once a day  Aspirin Enteric Coated 81 mg oral delayed release tablet: 1 tab(s) orally once a day  atorvastatin 80 mg oral tablet: 1 tab(s) orally once a day  clopidogrel 75 mg oral tablet: 1 tab(s) orally once a day  diphenhydrAMINE 25 mg oral capsule: 1 cap(s) orally , As Needed  Ferrous  mg-100 mg oral capsule, extended release: 1 tab(s) orally 2 times a week, Yanely  lisinopril-hydrochlorothiazide 20 mg-25 mg oral tablet: 1 tab(s) orally once a day  metFORMIN 500 mg oral tablet: 1 tab(s) orally 2 times a day  Metoprolol Succinate  mg oral tablet, extended release: 1 tab(s) orally once a day  Protonix 40 mg oral delayed release tablet: 1 tab(s) orally once a day

## 2021-11-03 NOTE — DISCHARGE NOTE PROVIDER - HOSPITAL COURSE
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 Pt denies fevers, chills, cough, CP, palpitations, SOB, lightheadedness, n/v, LE edema, skin changes, ulcers, hair loss. CTA abd w/ runoff (per MD note) revealed severe R DANIELA stenosis. ECHO from 8/31/16: EF 35-40%, apical segments of LV severely hypokinetic. In light of pt risk factors, [place] Class III symptoms, and abnormal CTA, pt now presents for LE angiogram with possible intervention if clinically indicated.    Patient is now s/p peripheral cath on 11/02/2021: Shockwave/PTA/stent x1 R DANIELA, R internal iliac artery mild stenosis, R CFA mild diffuse disease, R SFA mild diffuse disease, R pop severe 80%, R AT mild diffuse disease, R PT mod diffuse disease. L CFA 7F s/p Perclose, R CFA 5F manual pull @5pm ( @3:30). IVF: NS [id number].    No significant events on telemetry overnight. Repeat EKG without ischemic changes. Patient has been medically cleared for discharge as per Dr. Coreas.  Patient has been given appropriate discharge instructions including medication regimen, access site management and follow up. Medications that patient needs refills on have been e-prescribed to preferred pharmacy.      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 Pt denies fevers, chills, cough, CP, palpitations, SOB, lightheadedness, n/v, LE edema, skin changes, ulcers, hair loss. CTA abd w/ runoff (per MD note) revealed severe R DANIELA stenosis. ECHO from 8/31/16: EF 35-40%, apical segments of LV severely hypokinetic. In light of pt risk factors, [place] Class III symptoms, and abnormal CTA, pt now presents for LE angiogram with possible intervention if clinically indicated.    Patient is now s/p peripheral cath on 11/02/2021: Shockwave/PTA/stent x1 R DANIELA, R internal iliac artery mild stenosis, R CFA mild diffuse disease, R SFA mild diffuse disease, R pop severe 80%, R AT mild diffuse disease, R PT mod diffuse disease. L CFA 7F s/p Perclose, R CFA 5F manual pull @5pm ( @3:30). IVF: NS [id number].    Patient walking with nurse, before discharge, started to feel dizzy and lightheaded, sat down on chair with RN Yasemin Ventura. As per RN, eyes rolled backwards and patient started urinated on self. Rapid response called. Patient's HR 48, BP: 86/45 mmHg. Patient likely with vasovagal episode as patient awake and aware of his surroundings within seconds. No LOC. No fall noted.  Rapid response cancelled. Patient started with  cc over 1 hour. Will continue with 1 hour BP checks and continue to monitor. Patient's SBP improved to 120s.  Repeat EKG without ischemic changes. Patient has been medically cleared for discharge as per Dr. Coreas.  Patient has been given appropriate discharge instructions including medication regimen, access site management and follow up. Medications that patient needs refills on have been e-prescribed to preferred pharmacy.

## 2021-11-03 NOTE — DISCHARGE NOTE PROVIDER - NSDCCPCAREPLAN_GEN_ALL_CORE_FT
PRINCIPAL DISCHARGE DIAGNOSIS  Diagnosis: PAD (peripheral artery disease)  Assessment and Plan of Treatment: -You underwent a peripheral catheterization on 11/02/2021  and the blockage in your RIGHT COMMON ILLIAC ARTERY was opened with stent placement. You are to take ASPIRIN 81 mg daily and PLAVIX (CLOPIDROGEL) 75 mg daily. These medications work to keep your stents open.  NEVER MISS A DOSE OF ASPIRIN OR PLAVIX; IF YOU DO, YOU ARE AT RISK OF YOUR STENT CLOSING AND HAVING A HEART ATTACK. DO NOT STOP THESE TWO MEDICATIONS UNLESS INSTRUCTED TO DO SO BY YOUR CARDIOLOGIST.  Your procedure was done through your groin. You do not need to keep this area covered and you may shower. Please avoid any heavy lifting  (no more than 3 to 5 lbs) or strenuous activity for five days. If you develop any swelling, bleeding, hardening of the skin (hematoma formation), acute pain, numbness/tingling  in your leg please contact your doctor immediately or call our 24/7 line: [phone] Please return to the hospital/seek immediate medical attention if worsening of symptoms- including not limited to chest pain, shortness of breath. Please follow up with Dr. Ambriz in 1-2 weeks. Please call his office and make an appointment to see him. Please continue a heart healthy diet low in sodium, cholesterol, and fat.        SECONDARY DISCHARGE DIAGNOSES  Diagnosis: DM (diabetes mellitus)  Assessment and Plan of Treatment: Please continue a diet low in sugar, exercise, monitoring your fingersticks, and adhering to your medication regimen of METFORMIN Please DO NOT take metformin 500 mg twice daily until 11/05/2021  in order to prevent an interaction with the contrast dye you received during your procedure. Please follow up with the doctor that manages your diabetes.    Diagnosis: Hypertension  Assessment and Plan of Treatment: You have a diagnosis of Hypertension or elevated blood pressure. Please continue taking your medications as listed to keep your blood pressure controlled. In addition, there are multiple lifestyle modifications that have been proven to lower blood pressure: maintaining a healthy body weight, engaging in regular physical activity for at least 30 minutes per day on most days, and consuming a diet rich in fruits, vegetables, and low-fat dairy products with a reduced amount of total and saturated fats and sodium. Please continue amlodipine 5 mg once daily, Hydrochlorothiazide-Lisinopril 25-20 mg once daily.   For blood pressures at home that are too high or low please see your Doctor or go to the Emergency Room as necessary.

## 2021-11-08 DIAGNOSIS — I25.2 OLD MYOCARDIAL INFARCTION: ICD-10-CM

## 2021-11-08 DIAGNOSIS — I73.9 PERIPHERAL VASCULAR DISEASE, UNSPECIFIED: ICD-10-CM

## 2021-11-08 DIAGNOSIS — E11.51 TYPE 2 DIABETES MELLITUS WITH DIABETIC PERIPHERAL ANGIOPATHY WITHOUT GANGRENE: ICD-10-CM

## 2021-11-08 DIAGNOSIS — Z86.16 PERSONAL HISTORY OF COVID-19: ICD-10-CM

## 2021-11-08 DIAGNOSIS — I12.9 HYPERTENSIVE CHRONIC KIDNEY DISEASE WITH STAGE 1 THROUGH STAGE 4 CHRONIC KIDNEY DISEASE, OR UNSPECIFIED CHRONIC KIDNEY DISEASE: ICD-10-CM

## 2021-11-08 DIAGNOSIS — I69.354 HEMIPLEGIA AND HEMIPARESIS FOLLOWING CEREBRAL INFARCTION AFFECTING LEFT NON-DOMINANT SIDE: ICD-10-CM

## 2021-11-08 DIAGNOSIS — N18.9 CHRONIC KIDNEY DISEASE, UNSPECIFIED: ICD-10-CM

## 2021-11-08 DIAGNOSIS — E11.22 TYPE 2 DIABETES MELLITUS WITH DIABETIC CHRONIC KIDNEY DISEASE: ICD-10-CM

## 2021-12-01 PROCEDURE — G9005: CPT

## 2022-02-22 NOTE — CONSULT NOTE ADULT - NEGATIVE NEUROLOGICAL SYMPTOMS
no loss of sensation/no difficulty walking/no tremors/no syncope/no vertigo/no headache/no loss of consciousness .

## 2023-04-10 NOTE — PROGRESS NOTE ADULT - EYES
detailed exam conjunctiva clear/PERRL/EOMI Tazorac Counseling:  Patient advised that medication is irritating and drying.  Patient may need to apply sparingly and wash off after an hour before eventually leaving it on overnight.  The patient verbalized understanding of the proper use and possible adverse effects of tazorac.  All of the patient's questions and concerns were addressed.